# Patient Record
Sex: FEMALE | Race: WHITE | NOT HISPANIC OR LATINO | Employment: OTHER | URBAN - METROPOLITAN AREA
[De-identification: names, ages, dates, MRNs, and addresses within clinical notes are randomized per-mention and may not be internally consistent; named-entity substitution may affect disease eponyms.]

---

## 2017-01-24 NOTE — TELEPHONE ENCOUNTER
Pt's pharmacy requesting a refill for noted medication. Pt LOV 1/20/16 with an upcoming appt 1/31/17. Please review and advise. Thank you. CLC

## 2017-01-25 RX ORDER — CARVEDILOL 25 MG/1
25 TABLET ORAL DAILY
Qty: 90 TABLET | Refills: 3 | Status: SHIPPED | OUTPATIENT
Start: 2017-01-25 | End: 2017-11-17 | Stop reason: SDUPTHER

## 2017-01-31 ENCOUNTER — OFFICE VISIT (OUTPATIENT)
Dept: FAMILY MEDICINE | Facility: CLINIC | Age: 82
End: 2017-01-31
Payer: MEDICARE

## 2017-01-31 VITALS
HEIGHT: 62 IN | SYSTOLIC BLOOD PRESSURE: 132 MMHG | OXYGEN SATURATION: 97 % | BODY MASS INDEX: 19.92 KG/M2 | RESPIRATION RATE: 16 BRPM | WEIGHT: 108.25 LBS | DIASTOLIC BLOOD PRESSURE: 60 MMHG | HEART RATE: 72 BPM

## 2017-01-31 DIAGNOSIS — N25.81 SECONDARY HYPERPARATHYROIDISM (OF RENAL ORIGIN): ICD-10-CM

## 2017-01-31 DIAGNOSIS — E46 OTHER PROTEIN-CALORIE MALNUTRITION: ICD-10-CM

## 2017-01-31 DIAGNOSIS — E03.4 HYPOTHYROIDISM DUE TO ACQUIRED ATROPHY OF THYROID: ICD-10-CM

## 2017-01-31 DIAGNOSIS — N18.4 CHRONIC KIDNEY DISEASE, STAGE IV (SEVERE): Primary | ICD-10-CM

## 2017-01-31 PROCEDURE — 99214 OFFICE O/P EST MOD 30 MIN: CPT | Mod: S$PBB,,, | Performed by: FAMILY MEDICINE

## 2017-01-31 PROCEDURE — 99999 PR PBB SHADOW E&M-EST. PATIENT-LVL III: CPT | Mod: PBBFAC,,, | Performed by: FAMILY MEDICINE

## 2017-01-31 PROCEDURE — 90732 PPSV23 VACC 2 YRS+ SUBQ/IM: CPT | Mod: PBBFAC,PO | Performed by: FAMILY MEDICINE

## 2017-01-31 PROCEDURE — 99213 OFFICE O/P EST LOW 20 MIN: CPT | Mod: PBBFAC,PO | Performed by: FAMILY MEDICINE

## 2017-01-31 NOTE — PROGRESS NOTES
HPI  Nikki Jaimes is a 85 y.o. female with multiple medical diagnoses as listed in the medical history and problem list that presents for interval evaluation  .      HPI  Here today for interval evaluation  No new complaints.  Back pain resolved  Seeing Nephrology regarding renal failure and CKD IV    PAST MEDICAL HISTORY:  Past Medical History   Diagnosis Date    Compression fracture of thoracic vertebra     Hypothyroid     Osteoporosis, unspecified        PAST SURGICAL HISTORY:  History reviewed. No pertinent past surgical history.    SOCIAL HISTORY:  Social History     Social History    Marital status:      Spouse name: N/A    Number of children: N/A    Years of education: N/A     Occupational History    Not on file.     Social History Main Topics    Smoking status: Never Smoker    Smokeless tobacco: Never Used    Alcohol use No    Drug use: No    Sexual activity: Not on file     Other Topics Concern    Not on file     Social History Narrative       FAMILY HISTORY:  Family History   Problem Relation Age of Onset    Heart disease Father     Diabetes Sister     Stroke Sister        ALLERGIES AND MEDICATIONS: updated and reviewed.  Review of patient's allergies indicates:   Allergen Reactions    Naproxen Other (See Comments)     hemorrhage     Current Outpatient Prescriptions   Medication Sig Dispense Refill    calcitRIOL (ROCALTROL) 0.25 MCG Cap Take 1 capsule (0.25 mcg total) by mouth every Mon, Tues, Wed, Thurs, Fri. 90 capsule 1    carvedilol (COREG) 25 MG tablet Take 1 tablet (25 mg total) by mouth once daily. 90 tablet 3    levothyroxine (SYNTHROID) 50 MCG tablet TAKE ONE TABLET BY MOUTH EVERY DAY 30 tablet 11    raloxifene (EVISTA) 60 mg tablet ONE DAILY 30 tablet 6     No current facility-administered medications for this visit.        ROS  Review of Systems   Constitutional: Negative for fatigue, fever and unexpected weight change.   HENT: Negative for congestion, hearing loss,  "rhinorrhea and sore throat.    Eyes: Negative for visual disturbance.   Respiratory: Negative for cough, chest tightness, shortness of breath and wheezing.    Cardiovascular: Negative for chest pain, palpitations and leg swelling.   Gastrointestinal: Negative for abdominal distention, abdominal pain, blood in stool, constipation, diarrhea, nausea and vomiting.   Genitourinary: Negative for difficulty urinating, dysuria, frequency, hematuria, menstrual problem, pelvic pain, urgency and vaginal bleeding.   Musculoskeletal: Negative for back pain, joint swelling and neck pain.   Skin: Negative for rash.   Neurological: Negative for dizziness, tremors, weakness, light-headedness, numbness and headaches.   Psychiatric/Behavioral: Negative for confusion, dysphoric mood and sleep disturbance. The patient is not nervous/anxious.        Physical Exam  Vitals:    01/31/17 1429   BP: 132/60   Pulse: 72   Resp: 16    Body mass index is 19.8 kg/(m^2).  Weight: 49.1 kg (108 lb 3.9 oz)   Height: 5' 2" (157.5 cm)     Physical Exam   Constitutional: She is oriented to person, place, and time. She appears well-developed and well-nourished.   HENT:   Head: Normocephalic and atraumatic.   Right Ear: External ear normal.   Left Ear: External ear normal.   Nose: Nose normal.   Mouth/Throat: Oropharynx is clear and moist.   Eyes: Conjunctivae and EOM are normal. Pupils are equal, round, and reactive to light. No scleral icterus.   Neck: Normal range of motion. Neck supple. No JVD present. No thyromegaly present.   Cardiovascular: Normal rate, regular rhythm and normal heart sounds.  Exam reveals no gallop and no friction rub.    No murmur heard.  Pulmonary/Chest: Effort normal and breath sounds normal. She has no wheezes. She has no rales.   Abdominal: Soft. Bowel sounds are normal. She exhibits no distension and no mass. There is no tenderness. There is no rebound and no guarding.   Musculoskeletal: Normal range of motion. She exhibits " no edema.   Lymphadenopathy:     She has no cervical adenopathy.   Neurological: She is alert and oriented to person, place, and time. She has normal strength. No cranial nerve deficit or sensory deficit.   Skin: Skin is warm and dry. No rash noted.   Vitals reviewed.    Lab Results   Component Value Date    WBC 8.82 10/22/2013    HGB 11.4 (L) 10/22/2013    HCT 35.4 (L) 10/22/2013     (H) 10/22/2013    CHOL 191 10/09/2012    TRIG 116 10/09/2012    HDL 40 10/09/2012    ALT 5 (L) 10/22/2012    AST 8 (L) 10/22/2012     11/14/2016    K 4.3 11/14/2016     (H) 11/14/2016    CREATININE 2.7 (H) 11/14/2016    BUN 26 (H) 11/14/2016    CO2 20 (L) 11/14/2016    TSH 4.400 (H) 01/25/2016         Health Maintenance       Date Due Completion Date    TETANUS VACCINE 12/4/1949 ---    Zoster Vaccine 12/4/1991 ---    Influenza Vaccine 8/1/2016 ---    Pneumococcal (65+) (2 of 2 - PPSV23) 1/20/2017 1/20/2016    DEXA SCAN 5/19/2017 5/19/2014 (Declined)    Override on 5/19/2014: Declined    Lipid Panel 10/9/2017 10/9/2012          Assessment & Plan    Chronic kidney disease, stage IV (severe) with Secondary hyperparathyroidism (of renal origin)  - Continue current therapy  - Serial blood pressure monitoring  - Diet and exercise education.  - Continue Nephrology    Hypothyroidism due to acquired atrophy of thyroid with Other protein-calorie malnutrition  - Continue current therapy   - Check TSH and lipids    Other orders  -     Pneumococcal Polysaccharide Vaccine (23 Valent) (SQ/IM)        Return in about 6 months (around 7/31/2017).

## 2017-02-01 ENCOUNTER — LAB VISIT (OUTPATIENT)
Dept: LAB | Facility: HOSPITAL | Age: 82
End: 2017-02-01
Attending: FAMILY MEDICINE
Payer: MEDICARE

## 2017-02-01 DIAGNOSIS — E46 OTHER PROTEIN-CALORIE MALNUTRITION: ICD-10-CM

## 2017-02-01 DIAGNOSIS — E03.4 HYPOTHYROIDISM DUE TO ACQUIRED ATROPHY OF THYROID: ICD-10-CM

## 2017-02-01 LAB
CHOLEST/HDLC SERPL: 5.1 {RATIO}
HDL/CHOLESTEROL RATIO: 19.7 %
HDLC SERPL-MCNC: 249 MG/DL
HDLC SERPL-MCNC: 49 MG/DL
LDLC SERPL CALC-MCNC: 175.8 MG/DL
NONHDLC SERPL-MCNC: 200 MG/DL
T4 FREE SERPL-MCNC: 1.35 NG/DL
TRIGL SERPL-MCNC: 121 MG/DL
TSH SERPL DL<=0.005 MIU/L-ACNC: 4.54 UIU/ML

## 2017-02-01 PROCEDURE — 80061 LIPID PANEL: CPT

## 2017-02-01 PROCEDURE — 36415 COLL VENOUS BLD VENIPUNCTURE: CPT | Mod: PO

## 2017-02-01 PROCEDURE — 84443 ASSAY THYROID STIM HORMONE: CPT

## 2017-02-01 PROCEDURE — 84439 ASSAY OF FREE THYROXINE: CPT

## 2017-02-07 RX ORDER — LEVOTHYROXINE SODIUM 50 UG/1
50 TABLET ORAL DAILY
Qty: 30 TABLET | Refills: 11 | Status: SHIPPED | OUTPATIENT
Start: 2017-02-07 | End: 2018-02-02 | Stop reason: SDUPTHER

## 2017-02-23 ENCOUNTER — TELEPHONE (OUTPATIENT)
Dept: NEPHROLOGY | Facility: CLINIC | Age: 82
End: 2017-02-23

## 2017-02-23 NOTE — TELEPHONE ENCOUNTER
----- Message from Brii Albarado sent at 2/23/2017 11:02 AM CST -----  Contact: , Prabhakar Jaimes  Patient called asking for advice about taking Zicam. , Prabhakar Jaimes needs to know if it is Ok for her to take.  Please call patient at [phone]. Thanks!

## 2017-02-24 NOTE — TELEPHONE ENCOUNTER
Patient informed.  She said she is feeling better.  When asked what is considered temporary, I advised her not to use it for more than a week.  Patient voiced understanding.

## 2017-03-23 RX ORDER — CALCITRIOL 0.25 UG/1
CAPSULE ORAL
Qty: 90 CAPSULE | Refills: 3 | Status: SHIPPED | OUTPATIENT
Start: 2017-03-23 | End: 2017-11-17 | Stop reason: SDUPTHER

## 2017-03-24 RX ORDER — RALOXIFENE HYDROCHLORIDE 60 MG/1
TABLET, FILM COATED ORAL
Qty: 90 TABLET | Refills: 3 | Status: SHIPPED | OUTPATIENT
Start: 2017-03-24 | End: 2018-06-11 | Stop reason: SDUPTHER

## 2017-05-12 ENCOUNTER — LAB VISIT (OUTPATIENT)
Dept: LAB | Facility: HOSPITAL | Age: 82
End: 2017-05-12
Attending: INTERNAL MEDICINE
Payer: MEDICARE

## 2017-05-12 DIAGNOSIS — N18.4 CHRONIC KIDNEY DISEASE, STAGE IV (SEVERE): ICD-10-CM

## 2017-05-12 LAB
ALBUMIN SERPL BCP-MCNC: 2.8 G/DL
ANION GAP SERPL CALC-SCNC: 9 MMOL/L
BASOPHILS # BLD AUTO: 0.05 K/UL
BASOPHILS NFR BLD: 0.8 %
BUN SERPL-MCNC: 30 MG/DL
CALCIUM SERPL-MCNC: 9.6 MG/DL
CHLORIDE SERPL-SCNC: 105 MMOL/L
CO2 SERPL-SCNC: 23 MMOL/L
CREAT SERPL-MCNC: 3.2 MG/DL
DIFFERENTIAL METHOD: ABNORMAL
EOSINOPHIL # BLD AUTO: 0.4 K/UL
EOSINOPHIL NFR BLD: 6.8 %
ERYTHROCYTE [DISTWIDTH] IN BLOOD BY AUTOMATED COUNT: 14.1 %
EST. GFR  (AFRICAN AMERICAN): 14.5 ML/MIN/1.73 M^2
EST. GFR  (NON AFRICAN AMERICAN): 12.6 ML/MIN/1.73 M^2
GLUCOSE SERPL-MCNC: 92 MG/DL
HCT VFR BLD AUTO: 32.7 %
HGB BLD-MCNC: 10.3 G/DL
LYMPHOCYTES # BLD AUTO: 1.6 K/UL
LYMPHOCYTES NFR BLD: 24.5 %
MCH RBC QN AUTO: 29.6 PG
MCHC RBC AUTO-ENTMCNC: 31.5 %
MCV RBC AUTO: 94 FL
MONOCYTES # BLD AUTO: 0.5 K/UL
MONOCYTES NFR BLD: 7.8 %
NEUTROPHILS # BLD AUTO: 3.9 K/UL
NEUTROPHILS NFR BLD: 59.5 %
PHOSPHATE SERPL-MCNC: 4.6 MG/DL
PLATELET # BLD AUTO: 325 K/UL
PMV BLD AUTO: 10.2 FL
POTASSIUM SERPL-SCNC: 4.5 MMOL/L
PTH-INTACT SERPL-MCNC: 45 PG/ML
RBC # BLD AUTO: 3.48 M/UL
SODIUM SERPL-SCNC: 137 MMOL/L
WBC # BLD AUTO: 6.5 K/UL

## 2017-05-12 PROCEDURE — 36415 COLL VENOUS BLD VENIPUNCTURE: CPT | Mod: PO

## 2017-05-12 PROCEDURE — 80069 RENAL FUNCTION PANEL: CPT

## 2017-05-12 PROCEDURE — 83970 ASSAY OF PARATHORMONE: CPT

## 2017-05-12 PROCEDURE — 85025 COMPLETE CBC W/AUTO DIFF WBC: CPT

## 2017-05-19 ENCOUNTER — OFFICE VISIT (OUTPATIENT)
Dept: NEPHROLOGY | Facility: CLINIC | Age: 82
End: 2017-05-19
Payer: MEDICARE

## 2017-05-19 VITALS
HEIGHT: 64 IN | SYSTOLIC BLOOD PRESSURE: 115 MMHG | HEART RATE: 72 BPM | DIASTOLIC BLOOD PRESSURE: 72 MMHG | RESPIRATION RATE: 16 BRPM

## 2017-05-19 DIAGNOSIS — N25.81 HYPERPARATHYROIDISM DUE TO RENAL INSUFFICIENCY: ICD-10-CM

## 2017-05-19 DIAGNOSIS — N20.0 CALCULUS OF KIDNEY: ICD-10-CM

## 2017-05-19 DIAGNOSIS — N28.1 ACQUIRED CYST OF KIDNEY: ICD-10-CM

## 2017-05-19 DIAGNOSIS — N18.4 CHRONIC KIDNEY DISEASE, STAGE IV (SEVERE): Primary | ICD-10-CM

## 2017-05-19 PROCEDURE — 99999 PR PBB SHADOW E&M-EST. PATIENT-LVL III: CPT | Mod: PBBFAC,,, | Performed by: INTERNAL MEDICINE

## 2017-05-19 PROCEDURE — 99214 OFFICE O/P EST MOD 30 MIN: CPT | Mod: S$PBB,,, | Performed by: INTERNAL MEDICINE

## 2017-05-19 PROCEDURE — 99213 OFFICE O/P EST LOW 20 MIN: CPT | Mod: PBBFAC,PO | Performed by: INTERNAL MEDICINE

## 2017-05-19 NOTE — MR AVS SNAPSHOT
[facility]  [address] [facility] [address]  [facility] [address]  Phone: [phone]                  Nikki Jaimes   2017 2:00 PM   Office Visit    Description:  Female : 1931   Provider:  Buddy Chávez MD   Department:  [facility]           Reason for Visit     Follow-up           Diagnoses this Visit        Comments    Chronic kidney disease, stage IV (severe)    -  Primary     Calculus of kidney         Acquired cyst of kidney         Hyperparathyroidism due to renal insufficiency                To Do List           Future Appointments        Provider Department Dept Phone    2017 12:00 PM LAB, [place] [facility] [phone]    2017 12:30 PM URINE [facility] [phone]    2017 2:30 PM Mj Castro MD [facility] [phone]    2017 10:00 AM LAB, [place] [facility] [phone]    2017 10:15 AM URINE [facility] [phone]      Goals (5 Years of Data)     None      Follow-Up and Disposition     Return in about 6 months (around 2017).      [facility]     [facility] Nurse Care Line - / Assistance  Unless otherwise directed by your provider, please contact [facility], our nurse care line that is available for  assistance.     Registered nurses in the [facility] provide: appointment scheduling, clinical advisement, health education, and other advisory services.  Call: [phone] (toll free)               Medications           Message regarding Medications     Verify the changes and/or additions to your medication regime listed below are the same as discussed with your clinician today.  If any of these changes or additions are incorrect, please notify your healthcare provider.             Verify that the below list of medications is an accurate representation of the medications you are currently taking.   "If none reported, the list may be blank. If incorrect, please contact your healthcare provider. Carry this list with you in case of emergency.           Current Medications     calcitRIOL (ROCALTROL) 0.25 MCG Cap TAKE 1 CAPSULES BY MOUTH EVERY MONDAY, TUESDAY, WEDNESDAY, THURSDAY, AND FRIDAY.    carvedilol (COREG) 25 MG tablet Take 1 tablet (25 mg total) by mouth once daily.    levothyroxine (SYNTHROID) 50 MCG tablet Take 1 tablet (50 mcg total) by mouth once daily.    raloxifene (EVISTA) 60 mg tablet ONE DAILY           Clinical Reference Information           Your Vitals Were     BP Pulse Resp Height          115/72 72 16 5' 4" (1.626 m)        Blood Pressure          Most Recent Value    BP  115/72      Allergies as of 5/19/2017     Naproxen      Immunizations Administered on Date of Encounter - 5/19/2017     None      Orders Placed During Today's Visit     Future Labs/Procedures Expected by Expires    Renal function panel  6/6/2017 6/30/2017    Urinalysis  6/6/2017 6/30/2017      [facility] Sign-Up     Activating your [facility] account is as easy as 1-2-3!     1) Visit [email or web address], select Sign Up Now, enter this activation code and your date of birth, then select Next.  [email or web address]  Expires: 7/3/2017  2:30 PM      2) Create a username and password to use when you visit [facility] in the future and select a security question in case you lose your password and select Next.    3) Enter your e-mail address and click Sign Up!    Additional Information  If you have questions, please e-mail [email or web address] or call [phone] to talk to our [facility] staff. Remember, [facility] is NOT to be used for urgent needs. For medical emergencies, dial 911.         Language Assistance Services     ATTENTION: Language assistance services are available, free of charge. Please call [phone].      ATENCIÓN: Si habla español, tiene a avery disposición servicios gratuitos de asistencia lingüística. Llame al " [phone].     ELIAS Ý: N?u b?n nói Ti?ng Vi?t, có các d?ch v? h? tr? ngôn ng? mi?n phí dành cho b?n. G?i s? [phone].         [facility] complies with applicable Federal civil rights laws and does not discriminate on the basis of race, color, national origin, age, disability, or sex.

## 2017-05-19 NOTE — PROGRESS NOTES
"Subjective:       Patient ID: Nikki Jaimes is a 85 y.o. White female who presents for return patient evaluation for chronic renal failure.    She is doing well with no new problems.  She has no uremic or urinary symptoms and is in her usual state of health.        Review of Systems   Constitutional: Positive for fatigue. Negative for appetite change, chills, fever and unexpected weight change.   Eyes: Negative for visual disturbance.   Respiratory: Negative for cough and shortness of breath.    Cardiovascular: Negative for chest pain and leg swelling.   Gastrointestinal: Negative for abdominal pain, diarrhea, nausea and vomiting.   Genitourinary: Negative for difficulty urinating, dysuria and hematuria.   Musculoskeletal: Positive for gait problem. Negative for back pain.   Skin: Negative for rash.   Neurological: Negative for weakness and headaches.         /72  Pulse 72  Resp 16  Ht 5' 4" (1.626 m)    Objective:      Physical Exam   Constitutional: She appears well-developed and well-nourished. No distress.   HENT:   Head: Normocephalic and atraumatic.   Eyes: Conjunctivae are normal. No scleral icterus.   Neck: Normal range of motion. No JVD present.   Cardiovascular: Normal rate, regular rhythm and normal heart sounds.  Exam reveals no gallop and no friction rub.    No murmur heard.  Pulmonary/Chest: Effort normal and breath sounds normal. No respiratory distress. She has no wheezes.   Abdominal: Soft. Bowel sounds are normal. She exhibits no distension. There is no tenderness.   Musculoskeletal: She exhibits no edema.   Skin: Skin is warm and dry. No rash noted.   Psychiatric: She has a normal mood and affect.   Vitals reviewed.      Assessment:       1. Chronic kidney disease, stage IV (severe)    2. Calculus of kidney    3. Acquired cyst of kidney    4. Hyperparathyroidism due to renal insufficiency        Plan:   Return to clinic in 6 months.  Labs for next visit include rp, pth, cbc.  Baseline " creatinine is 2.3 since 2012.  PTH is 45 with a calcium of 9.6.  RP and Ua on 6/6.  I do not see any reason why she has had a departure from her usual baseline.  She is asymptomatic.

## 2017-06-07 ENCOUNTER — TELEPHONE (OUTPATIENT)
Dept: NEPHROLOGY | Facility: CLINIC | Age: 82
End: 2017-06-07

## 2017-06-07 NOTE — TELEPHONE ENCOUNTER
----- Message from Ning Marmolejo sent at 6/7/2017  1:28 PM CDT -----  Contact:   Needs to know if it's ok for patient to take Allegra and if so, what dosage.  Please call back at [phone] (home)

## 2017-06-08 ENCOUNTER — LAB VISIT (OUTPATIENT)
Dept: LAB | Facility: HOSPITAL | Age: 82
End: 2017-06-08
Attending: INTERNAL MEDICINE
Payer: MEDICARE

## 2017-06-08 DIAGNOSIS — N20.0 CALCULUS OF KIDNEY: ICD-10-CM

## 2017-06-08 DIAGNOSIS — N25.81 HYPERPARATHYROIDISM DUE TO RENAL INSUFFICIENCY: ICD-10-CM

## 2017-06-08 DIAGNOSIS — N18.4 CHRONIC KIDNEY DISEASE, STAGE IV (SEVERE): ICD-10-CM

## 2017-06-08 DIAGNOSIS — N28.1 ACQUIRED CYST OF KIDNEY: ICD-10-CM

## 2017-06-08 LAB
ALBUMIN SERPL BCP-MCNC: 2.7 G/DL
ANION GAP SERPL CALC-SCNC: 9 MMOL/L
BUN SERPL-MCNC: 31 MG/DL
CALCIUM SERPL-MCNC: 9.7 MG/DL
CHLORIDE SERPL-SCNC: 104 MMOL/L
CO2 SERPL-SCNC: 23 MMOL/L
CREAT SERPL-MCNC: 3.2 MG/DL
EST. GFR  (AFRICAN AMERICAN): 14.5 ML/MIN/1.73 M^2
EST. GFR  (NON AFRICAN AMERICAN): 12.6 ML/MIN/1.73 M^2
GLUCOSE SERPL-MCNC: 107 MG/DL
PHOSPHATE SERPL-MCNC: 4.5 MG/DL
POTASSIUM SERPL-SCNC: 5 MMOL/L
SODIUM SERPL-SCNC: 136 MMOL/L

## 2017-06-08 PROCEDURE — 36415 COLL VENOUS BLD VENIPUNCTURE: CPT | Mod: PO

## 2017-06-08 PROCEDURE — 80069 RENAL FUNCTION PANEL: CPT

## 2017-07-05 ENCOUNTER — LAB VISIT (OUTPATIENT)
Dept: LAB | Facility: HOSPITAL | Age: 82
End: 2017-07-05
Attending: FAMILY MEDICINE
Payer: MEDICARE

## 2017-07-05 ENCOUNTER — OFFICE VISIT (OUTPATIENT)
Dept: FAMILY MEDICINE | Facility: CLINIC | Age: 82
End: 2017-07-05
Payer: MEDICARE

## 2017-07-05 VITALS
DIASTOLIC BLOOD PRESSURE: 72 MMHG | BODY MASS INDEX: 18.44 KG/M2 | RESPIRATION RATE: 16 BRPM | HEIGHT: 64 IN | WEIGHT: 108 LBS | HEART RATE: 70 BPM | SYSTOLIC BLOOD PRESSURE: 120 MMHG

## 2017-07-05 DIAGNOSIS — E03.4 HYPOTHYROIDISM DUE TO ACQUIRED ATROPHY OF THYROID: ICD-10-CM

## 2017-07-05 DIAGNOSIS — Z78.0 ASYMPTOMATIC MENOPAUSAL STATE: ICD-10-CM

## 2017-07-05 DIAGNOSIS — N18.4 CHRONIC KIDNEY DISEASE, STAGE IV (SEVERE): Primary | ICD-10-CM

## 2017-07-05 LAB — TSH SERPL DL<=0.005 MIU/L-ACNC: 3.5 UIU/ML

## 2017-07-05 PROCEDURE — 84443 ASSAY THYROID STIM HORMONE: CPT

## 2017-07-05 PROCEDURE — 99213 OFFICE O/P EST LOW 20 MIN: CPT | Mod: S$PBB,,, | Performed by: FAMILY MEDICINE

## 2017-07-05 PROCEDURE — 1126F AMNT PAIN NOTED NONE PRSNT: CPT | Mod: ,,, | Performed by: FAMILY MEDICINE

## 2017-07-05 PROCEDURE — 36415 COLL VENOUS BLD VENIPUNCTURE: CPT | Mod: PO

## 2017-07-05 PROCEDURE — 1159F MED LIST DOCD IN RCRD: CPT | Mod: ,,, | Performed by: FAMILY MEDICINE

## 2017-07-05 PROCEDURE — 99999 PR PBB SHADOW E&M-EST. PATIENT-LVL III: CPT | Mod: PBBFAC,,, | Performed by: FAMILY MEDICINE

## 2017-07-05 NOTE — PROGRESS NOTES
Subjective:       Patient ID: Nikki Jaimes is a 85 y.o. female    Chief Complaint: Chronic Kidney Disease (follow up; hm due: tetanus, zoster, dexa)    HPI  Here today for interval evaluation  She has no new complaints    Review of Systems      Objective:   Physical Exam   Constitutional: She is oriented to person, place, and time. She appears well-developed and well-nourished.   Neurological: She is alert and oriented to person, place, and time.   Vitals reviewed.    Lab Results   Component Value Date    WBC 6.50 05/12/2017    HGB 10.3 (L) 05/12/2017    HCT 32.7 (L) 05/12/2017     05/12/2017    CHOL 249 (H) 02/01/2017    TRIG 121 02/01/2017    HDL 49 02/01/2017    ALT 5 (L) 10/22/2012    AST 8 (L) 10/22/2012     06/08/2017    K 5.0 06/08/2017     06/08/2017    CREATININE 3.2 (H) 06/08/2017    BUN 31 (H) 06/08/2017    CO2 23 06/08/2017    TSH 4.539 (H) 02/01/2017          Assessment:       1. Chronic kidney disease, stage IV (severe)     2. Hypothyroidism due to acquired atrophy of thyroid  TSH   3. Asymptomatic menopausal state  DXA Bone Density Spine And Hip         Plan:       Chronic kidney disease, stage IV (severe)  - Continue current therapy  - Continue Nephrology    Hypothyroidism due to acquired atrophy of thyroid  -     TSH; Future; Expected date: 07/05/2017  - Continue current therapy     Asymptomatic menopausal state  -     DXA Bone Density Spine And Hip; Future; Expected date: 07/05/2017

## 2017-07-07 ENCOUNTER — HOSPITAL ENCOUNTER (OUTPATIENT)
Dept: RADIOLOGY | Facility: HOSPITAL | Age: 82
Discharge: HOME | End: 2017-07-07
Attending: FAMILY MEDICINE
Payer: MEDICARE

## 2017-07-07 DIAGNOSIS — Z78.0 ASYMPTOMATIC MENOPAUSAL STATE: ICD-10-CM

## 2017-07-07 PROCEDURE — 77080 DXA BONE DENSITY AXIAL: CPT | Mod: TC,PO

## 2017-07-07 PROCEDURE — 77080 DXA BONE DENSITY AXIAL: CPT | Mod: 26,,, | Performed by: RADIOLOGY

## 2017-10-03 ENCOUNTER — TELEPHONE (OUTPATIENT)
Dept: FAMILY MEDICINE | Facility: CLINIC | Age: 82
End: 2017-10-03

## 2017-10-03 DIAGNOSIS — L60.0 INGROWN TOENAIL: Primary | ICD-10-CM

## 2017-10-03 NOTE — TELEPHONE ENCOUNTER
----- Message from Yves Graff sent at 10/2/2017  4:04 PM CDT -----  Contact: Prabhakar    Wants to have an ok from Dr. Castro to see podiatry. His wife has ingrown toe nail. It is starting to hurt her much more lately. Please [phone] (home)

## 2017-10-05 ENCOUNTER — OFFICE VISIT (OUTPATIENT)
Dept: PODIATRY | Facility: CLINIC | Age: 82
End: 2017-10-05
Payer: MEDICARE

## 2017-10-05 VITALS — HEIGHT: 64 IN | WEIGHT: 106.69 LBS | BODY MASS INDEX: 18.21 KG/M2

## 2017-10-05 DIAGNOSIS — L03.031 PARONYCHIA OF GREAT TOE, RIGHT: ICD-10-CM

## 2017-10-05 DIAGNOSIS — B35.1 ONYCHOMYCOSIS DUE TO DERMATOPHYTE: ICD-10-CM

## 2017-10-05 DIAGNOSIS — L60.0 INGROWN NAIL: Primary | ICD-10-CM

## 2017-10-05 PROCEDURE — 99212 OFFICE O/P EST SF 10 MIN: CPT | Mod: PBBFAC,PO | Performed by: PODIATRIST

## 2017-10-05 PROCEDURE — 99999 PR PBB SHADOW E&M-EST. PATIENT-LVL II: CPT | Mod: PBBFAC,,, | Performed by: PODIATRIST

## 2017-10-05 PROCEDURE — 10060 I&D ABSCESS SIMPLE/SINGLE: CPT | Mod: T5,PBBFAC,PO | Performed by: PODIATRIST

## 2017-10-05 PROCEDURE — 10060 I&D ABSCESS SIMPLE/SINGLE: CPT | Mod: S$PBB,,, | Performed by: PODIATRIST

## 2017-10-05 PROCEDURE — 99203 OFFICE O/P NEW LOW 30 MIN: CPT | Mod: 25,S$PBB,, | Performed by: PODIATRIST

## 2017-10-05 RX ORDER — LATANOPROST 50 UG/ML
1 SOLUTION/ DROPS OPHTHALMIC NIGHTLY
Refills: 3 | COMMUNITY
Start: 2017-09-11

## 2017-10-05 NOTE — LETTER
October 5, 2017      Mj Castro MD  [address] [facility] [address] [place] [address]           [facility]  [address] [facility] [address] [place] [address]  Phone: [phone]          Patient: Nikki Jaimes   MR Number: [id number]   YOB: 1931   Date of Visit: 10/5/2017       Dear Dr. Mj Castro:    Thank you for referring Nikki Jaimes to me for evaluation. Attached you will find relevant portions of my assessment and plan of care.    If you have questions, please do not hesitate to call me. I look forward to following Nikki Jaimes along with you.    Sincerely,    Be Hidalgo, MERCEDEZ    Enclosure  CC:  No Recipients    If you would like to receive this communication electronically, please contact [email or web address] or [phone] to request more information on [facility] access.    For providers and/or their staff who would like to refer a patient to [facility], please contact us through our one-stop-shop provider referral line, [facility], at [phone].    If you feel you have received this communication in error or would no longer like to receive these types of communications, please e-mail [email or web address]

## 2017-10-05 NOTE — PROGRESS NOTES
Subjective:      Patient ID: Nikki Jaimes is a 85 y.o. female.    Chief Complaint: Ingrown Toenail (Right great) and Other Misc (PCP:  Dr Castro  7/5/17)    Nikki is a 85 y.o. female who presents to the clinic complaining of painful ingrown toenail on the right foot great toe medial border. The area has been painful for a couple of weeks now. She has noticed the area has gotten more red over the last couple of days. Has not done anything to treat it to date. Pain is worse with pressure to the area and with shoe wear. Patient and her  feel her nails are thick and difficult to cut and would like to inquire as to getting them all trimmed.     Review of Systems   Constitution: Positive for weakness. Negative for chills and fever.   Cardiovascular: Negative for claudication and leg swelling.   Respiratory: Negative for shortness of breath.    Skin: Positive for color change and nail changes. Negative for itching and rash.   Musculoskeletal: Negative for muscle cramps, muscle weakness and myalgias.   Gastrointestinal: Negative for nausea and vomiting.   Neurological: Negative for focal weakness, loss of balance, numbness and paresthesias.           Objective:      Physical Exam   Constitutional: She is oriented to person, place, and time. She appears well-developed and well-nourished. No distress.   Cardiovascular:   Pulses:       Dorsalis pedis pulses are 1+ on the right side, and 1+ on the left side.        Posterior tibial pulses are 1+ on the right side, and 1+ on the left side.   < 3 sec capillary refill time to toes 1-5 bilateral. Toes and feet are warm to touch proximally with normal distal cooling b/l. There is no hair growth on the feet and toes b/l. There is minimal edema b/l. There are varicosities present b/l.      Musculoskeletal:   Equinus noted b/l ankles with < 10 deg DF noted. MMT 5/5 in DF/PF/Inv/Ev resistance with no reproduction of pain in any direction. Passive range of motion of ankle and  pedal joints is painless b/l.     Neurological: She is alert and oriented to person, place, and time. She has normal strength. She displays no atrophy and no tremor. No sensory deficit. She exhibits normal muscle tone.   Negative tinel sign bilateral.   Skin: Skin is warm, dry and intact. No abrasion, no bruising, no burn, no ecchymosis, no laceration, no lesion, no petechiae and no rash noted. She is not diaphoretic. No cyanosis or erythema. No pallor. Nails show no clubbing.   Skin temperature, texture and turgor within normal limits and age appropriate.     medial hallux nail margin of right foot with ingrown nail plate. Surrounding erythema and minimal edema is noted there is no granuloma formation noted. There is slight purulent drainage with no malodor.    Nails 1-5 bilateral are thick 3-4 mm, long 3-6 mm, and discolored with subungual debris     Psychiatric: She has a normal mood and affect. Her behavior is normal.           Assessment:       Encounter Diagnoses   Name Primary?    Ingrown nail Yes    Paronychia of great toe, right     Onychomycosis due to dermatophyte          Plan:       Nikki was seen today for ingrown toenail and other misc.    Diagnoses and all orders for this visit:    Ingrown nail    Paronychia of great toe, right    Onychomycosis due to dermatophyte      I counseled the patient on her conditions, their implications and medical management.    Utilizing sterile toenail clippers I aggressively debrided the offending nail border right hallux medial border approximately 3 mm from its edge and carried the nail plate incision down at an angle in order to wedge out the offending cryptotic portion of the nail plate. The offending border was then removed in total. A small amount of purulent drainage was noted and expelled from the area. The underlying wound base was granular. The area was flushed with sterile saline and covered with neosporin and a band-aid.  Minimal blood was drawn and  hemostasis achieved with pressure. No anesthesia was necessary. Patient tolerated the procedure well and related significant relief.    Discussed that if the ingrown nail returns or gives her repeated problems she can have a more invasive procedure done to remove the nail with or without matrixectomy. Risks and benefits of procedure explained.     With patient's verbal consent, nails were aggressively reduced and debrided x 10 to their soft tissue attachment mechanically and with electric , removing all offending nail and debris. Patient relates relief following the procedure. No anesthesia or hemostasis required. No blood loss. Patient does not qualify for covered service, it was explained that the service today would not be billed, if she needs help with nail debridement in the future she will need to make an appointment as a proc B, a handout was given explaining the procedure B program, patient and her  voiced understanding.    Return PHONG Hidalgo DPM

## 2017-11-13 ENCOUNTER — LAB VISIT (OUTPATIENT)
Dept: LAB | Facility: HOSPITAL | Age: 82
End: 2017-11-13
Attending: INTERNAL MEDICINE
Payer: MEDICARE

## 2017-11-13 DIAGNOSIS — N18.4 CHRONIC KIDNEY DISEASE, STAGE IV (SEVERE): ICD-10-CM

## 2017-11-13 LAB
ALBUMIN SERPL BCP-MCNC: 2.7 G/DL
ANION GAP SERPL CALC-SCNC: 8 MMOL/L
BUN SERPL-MCNC: 29 MG/DL
CALCIUM SERPL-MCNC: 8.7 MG/DL
CHLORIDE SERPL-SCNC: 108 MMOL/L
CO2 SERPL-SCNC: 22 MMOL/L
CREAT SERPL-MCNC: 2.9 MG/DL
EST. GFR  (AFRICAN AMERICAN): 16.4 ML/MIN/1.73 M^2
EST. GFR  (NON AFRICAN AMERICAN): 14.2 ML/MIN/1.73 M^2
GLUCOSE SERPL-MCNC: 99 MG/DL
PHOSPHATE SERPL-MCNC: 4.3 MG/DL
POTASSIUM SERPL-SCNC: 5.2 MMOL/L
PTH-INTACT SERPL-MCNC: 60 PG/ML
SODIUM SERPL-SCNC: 138 MMOL/L

## 2017-11-13 PROCEDURE — 83970 ASSAY OF PARATHORMONE: CPT

## 2017-11-13 PROCEDURE — 80069 RENAL FUNCTION PANEL: CPT

## 2017-11-13 PROCEDURE — 36415 COLL VENOUS BLD VENIPUNCTURE: CPT | Mod: PO

## 2017-11-17 ENCOUNTER — OFFICE VISIT (OUTPATIENT)
Dept: NEPHROLOGY | Facility: CLINIC | Age: 82
End: 2017-11-17
Payer: MEDICARE

## 2017-11-17 VITALS
OXYGEN SATURATION: 96 % | DIASTOLIC BLOOD PRESSURE: 68 MMHG | HEIGHT: 63 IN | SYSTOLIC BLOOD PRESSURE: 110 MMHG | HEART RATE: 86 BPM | BODY MASS INDEX: 19.03 KG/M2 | RESPIRATION RATE: 18 BRPM | WEIGHT: 107.38 LBS | TEMPERATURE: 98 F

## 2017-11-17 DIAGNOSIS — N20.0 CALCULUS OF KIDNEY: ICD-10-CM

## 2017-11-17 DIAGNOSIS — N25.81 HYPERPARATHYROIDISM DUE TO RENAL INSUFFICIENCY: ICD-10-CM

## 2017-11-17 DIAGNOSIS — N18.4 CHRONIC KIDNEY DISEASE, STAGE IV (SEVERE): Primary | ICD-10-CM

## 2017-11-17 DIAGNOSIS — N28.1 ACQUIRED CYST OF KIDNEY: ICD-10-CM

## 2017-11-17 PROCEDURE — 99999 PR PBB SHADOW E&M-EST. PATIENT-LVL III: CPT | Mod: PBBFAC,,, | Performed by: INTERNAL MEDICINE

## 2017-11-17 PROCEDURE — 99214 OFFICE O/P EST MOD 30 MIN: CPT | Mod: S$PBB,,, | Performed by: INTERNAL MEDICINE

## 2017-11-17 PROCEDURE — 99213 OFFICE O/P EST LOW 20 MIN: CPT | Mod: PBBFAC,PO | Performed by: INTERNAL MEDICINE

## 2017-11-17 RX ORDER — CARVEDILOL 12.5 MG/1
12.5 TABLET ORAL DAILY
Qty: 90 TABLET | Refills: 1 | Status: SHIPPED | OUTPATIENT
Start: 2017-11-17 | End: 2018-01-23 | Stop reason: SDUPTHER

## 2017-11-17 RX ORDER — CALCITRIOL 0.25 UG/1
0.25 CAPSULE ORAL DAILY
Qty: 90 CAPSULE | Refills: 1 | Status: SHIPPED | OUTPATIENT
Start: 2017-11-17 | End: 2019-04-29

## 2018-01-28 RX ORDER — CARVEDILOL 12.5 MG/1
12.5 TABLET ORAL DAILY
Qty: 90 TABLET | Refills: 1 | OUTPATIENT
Start: 2018-01-28

## 2018-01-28 RX ORDER — CARVEDILOL 12.5 MG/1
12.5 TABLET ORAL DAILY
Qty: 90 TABLET | Refills: 1 | Status: SHIPPED | OUTPATIENT
Start: 2018-01-28 | End: 2018-08-09 | Stop reason: SDUPTHER

## 2018-01-28 RX ORDER — CARVEDILOL 25 MG/1
TABLET ORAL
Qty: 90 TABLET | Refills: 1 | OUTPATIENT
Start: 2018-01-28

## 2018-02-05 RX ORDER — LEVOTHYROXINE SODIUM 50 UG/1
TABLET ORAL
Qty: 30 TABLET | Refills: 6 | Status: SHIPPED | OUTPATIENT
Start: 2018-02-05 | End: 2018-08-30 | Stop reason: SDUPTHER

## 2018-03-26 ENCOUNTER — TELEPHONE (OUTPATIENT)
Dept: FAMILY MEDICINE | Facility: CLINIC | Age: 83
End: 2018-03-26

## 2018-03-26 NOTE — TELEPHONE ENCOUNTER
Spoke to pt to reschedule appt on 3/27. Attempted to schedule pt on 4/23. Pt states that this is the second time he has had to reschedule and he cannot wait another month. Please advise.

## 2018-04-09 ENCOUNTER — OFFICE VISIT (OUTPATIENT)
Dept: FAMILY MEDICINE | Facility: CLINIC | Age: 83
End: 2018-04-09
Payer: MEDICARE

## 2018-04-09 VITALS
WEIGHT: 107.38 LBS | OXYGEN SATURATION: 98 % | HEIGHT: 63 IN | DIASTOLIC BLOOD PRESSURE: 68 MMHG | BODY MASS INDEX: 19.03 KG/M2 | SYSTOLIC BLOOD PRESSURE: 120 MMHG | HEART RATE: 72 BPM

## 2018-04-09 DIAGNOSIS — E46 PROTEIN-CALORIE MALNUTRITION, UNSPECIFIED SEVERITY: ICD-10-CM

## 2018-04-09 DIAGNOSIS — N18.4 CHRONIC KIDNEY DISEASE, STAGE IV (SEVERE): Primary | ICD-10-CM

## 2018-04-09 PROCEDURE — 99213 OFFICE O/P EST LOW 20 MIN: CPT | Mod: S$PBB,,, | Performed by: FAMILY MEDICINE

## 2018-04-09 PROCEDURE — 99213 OFFICE O/P EST LOW 20 MIN: CPT | Mod: PBBFAC,PO | Performed by: FAMILY MEDICINE

## 2018-04-09 PROCEDURE — 99999 PR PBB SHADOW E&M-EST. PATIENT-LVL III: CPT | Mod: PBBFAC,,, | Performed by: FAMILY MEDICINE

## 2018-04-09 NOTE — PROGRESS NOTES
Subjective:       Patient ID: Nikki Jaimes is a 86 y.o. female    Chief Complaint: Follow-up (Pt. here for a foolow up visit. Pt reports no new problems and no complaints today.)    HPI  Here today for interval evaluation  No new complaints.  Reports minimal appetite    Review of Systems     Objective:   Physical Exam   Constitutional: She appears well-developed and well-nourished.   HENT:   Head: Normocephalic and atraumatic.   Eyes: Conjunctivae and EOM are normal. Pupils are equal, round, and reactive to light. No scleral icterus.   Neck: Normal range of motion. Neck supple. No thyromegaly present.   Cardiovascular: Normal rate, regular rhythm and normal heart sounds.  Exam reveals no gallop and no friction rub.    No murmur heard.  Pulmonary/Chest: Effort normal and breath sounds normal. No respiratory distress. She has no wheezes. She has no rales.   Lymphadenopathy:     She has no cervical adenopathy.   Vitals reviewed.    CMP  Sodium   Date Value Ref Range Status   11/13/2017 138 136 - 145 mmol/L Final     Potassium   Date Value Ref Range Status   11/13/2017 5.2 (H) 3.5 - 5.1 mmol/L Final     Chloride   Date Value Ref Range Status   11/13/2017 108 95 - 110 mmol/L Final     CO2   Date Value Ref Range Status   11/13/2017 22 (L) 23 - 29 mmol/L Final     Glucose   Date Value Ref Range Status   11/13/2017 99 70 - 110 mg/dL Final     BUN, Bld   Date Value Ref Range Status   11/13/2017 29 (H) 8 - 23 mg/dL Final     Creatinine   Date Value Ref Range Status   11/13/2017 2.9 (H) 0.5 - 1.4 mg/dL Final     Calcium   Date Value Ref Range Status   11/13/2017 8.7 8.7 - 10.5 mg/dL Final     Total Protein   Date Value Ref Range Status   10/22/2012 8.1 6.0 - 8.4 g/dL Final     Albumin   Date Value Ref Range Status   11/13/2017 2.7 (L) 3.5 - 5.2 g/dL Final     Total Bilirubin   Date Value Ref Range Status   10/22/2012 0.3 0.1 - 1.0 mg/dl Final     Comment:     For infants and newborns, interpretation of results should be  based  on gestational age, weight and in agreement with clinical  observations.  .  Premature Infant recommended reference ranges:  Up to 24 hours.............<8.0 mg/dl  Up to 48 hours............<12.0 mg/dl  3-5 days..................<15.0 mg/dl  6-29 days.................<15.0 mg/dl     Alkaline Phosphatase   Date Value Ref Range Status   10/22/2012 68 55 - 135 U/L Final     AST   Date Value Ref Range Status   10/22/2012 8 (L) 10 - 40 U/L Final     ALT   Date Value Ref Range Status   10/22/2012 5 (L) 10 - 44 U/L Final     Anion Gap   Date Value Ref Range Status   11/13/2017 8 8 - 16 mmol/L Final     eGFR if    Date Value Ref Range Status   11/13/2017 16.4 (A) >60 mL/min/1.73 m^2 Final     eGFR if non    Date Value Ref Range Status   11/13/2017 14.2 (A) >60 mL/min/1.73 m^2 Final     Comment:     Calculation used to obtain the estimated glomerular filtration  rate (eGFR) is the CKD-EPI equation.             Assessment:       1. Chronic kidney disease, stage IV (severe)     2. Protein-calorie malnutrition, unspecified severity          Plan:       Chronic kidney disease, stage IV (severe)  - Continue current therapy  - Continue Nephrology    Protein-calorie malnutrition, unspecified severity  - Discussed calorie supplement    Follow-up in about 6 months (around 10/9/2018).

## 2018-05-10 ENCOUNTER — LAB VISIT (OUTPATIENT)
Dept: LAB | Facility: HOSPITAL | Age: 83
End: 2018-05-10
Attending: INTERNAL MEDICINE
Payer: MEDICARE

## 2018-05-10 DIAGNOSIS — N18.4 CHRONIC KIDNEY DISEASE, STAGE IV (SEVERE): ICD-10-CM

## 2018-05-10 LAB
ALBUMIN SERPL BCP-MCNC: 2.8 G/DL
ANION GAP SERPL CALC-SCNC: 5 MMOL/L
BASOPHILS # BLD AUTO: 0.09 K/UL
BASOPHILS NFR BLD: 1.2 %
BUN SERPL-MCNC: 29 MG/DL
CALCIUM SERPL-MCNC: 8.9 MG/DL
CHLORIDE SERPL-SCNC: 108 MMOL/L
CO2 SERPL-SCNC: 24 MMOL/L
CREAT SERPL-MCNC: 2.6 MG/DL
DIFFERENTIAL METHOD: ABNORMAL
EOSINOPHIL # BLD AUTO: 0.5 K/UL
EOSINOPHIL NFR BLD: 6 %
ERYTHROCYTE [DISTWIDTH] IN BLOOD BY AUTOMATED COUNT: 13.8 %
EST. GFR  (AFRICAN AMERICAN): 18.6 ML/MIN/1.73 M^2
EST. GFR  (NON AFRICAN AMERICAN): 16.1 ML/MIN/1.73 M^2
GLUCOSE SERPL-MCNC: 105 MG/DL
HCT VFR BLD AUTO: 33.7 %
HGB BLD-MCNC: 10.3 G/DL
IMM GRANULOCYTES # BLD AUTO: 0.02 K/UL
IMM GRANULOCYTES NFR BLD AUTO: 0.3 %
LYMPHOCYTES # BLD AUTO: 1.5 K/UL
LYMPHOCYTES NFR BLD: 19.9 %
MCH RBC QN AUTO: 29.9 PG
MCHC RBC AUTO-ENTMCNC: 30.6 G/DL
MCV RBC AUTO: 98 FL
MONOCYTES # BLD AUTO: 0.6 K/UL
MONOCYTES NFR BLD: 7.6 %
NEUTROPHILS # BLD AUTO: 4.9 K/UL
NEUTROPHILS NFR BLD: 65 %
NRBC BLD-RTO: 0 /100 WBC
PHOSPHATE SERPL-MCNC: 4.1 MG/DL
PLATELET # BLD AUTO: 319 K/UL
PMV BLD AUTO: 10.6 FL
POTASSIUM SERPL-SCNC: 4.7 MMOL/L
PTH-INTACT SERPL-MCNC: 94 PG/ML
RBC # BLD AUTO: 3.45 M/UL
SODIUM SERPL-SCNC: 137 MMOL/L
WBC # BLD AUTO: 7.54 K/UL

## 2018-05-10 PROCEDURE — 36415 COLL VENOUS BLD VENIPUNCTURE: CPT | Mod: PO

## 2018-05-10 PROCEDURE — 85025 COMPLETE CBC W/AUTO DIFF WBC: CPT

## 2018-05-10 PROCEDURE — 83970 ASSAY OF PARATHORMONE: CPT

## 2018-05-10 PROCEDURE — 80069 RENAL FUNCTION PANEL: CPT

## 2018-05-18 ENCOUNTER — OFFICE VISIT (OUTPATIENT)
Dept: NEPHROLOGY | Facility: CLINIC | Age: 83
End: 2018-05-18
Payer: MEDICARE

## 2018-05-18 VITALS
SYSTOLIC BLOOD PRESSURE: 122 MMHG | WEIGHT: 108.44 LBS | HEART RATE: 74 BPM | OXYGEN SATURATION: 95 % | DIASTOLIC BLOOD PRESSURE: 74 MMHG | HEIGHT: 63 IN | BODY MASS INDEX: 19.21 KG/M2

## 2018-05-18 DIAGNOSIS — N28.1 ACQUIRED CYST OF KIDNEY: ICD-10-CM

## 2018-05-18 DIAGNOSIS — N25.81 HYPERPARATHYROIDISM DUE TO RENAL INSUFFICIENCY: ICD-10-CM

## 2018-05-18 DIAGNOSIS — N20.0 CALCULUS OF KIDNEY: ICD-10-CM

## 2018-05-18 DIAGNOSIS — N18.4 CHRONIC KIDNEY DISEASE, STAGE IV (SEVERE): Primary | ICD-10-CM

## 2018-05-18 PROCEDURE — 99214 OFFICE O/P EST MOD 30 MIN: CPT | Mod: S$PBB,,, | Performed by: INTERNAL MEDICINE

## 2018-05-18 PROCEDURE — 99999 PR PBB SHADOW E&M-EST. PATIENT-LVL III: CPT | Mod: PBBFAC,,, | Performed by: INTERNAL MEDICINE

## 2018-05-18 PROCEDURE — 99213 OFFICE O/P EST LOW 20 MIN: CPT | Mod: PBBFAC,PO | Performed by: INTERNAL MEDICINE

## 2018-05-18 NOTE — PROGRESS NOTES
"Subjective:       Patient ID: Nikki Jaimes is a 86 y.o. White female who presents for return patient evaluation for chronic renal failure.    She has no uremic or urinary symptoms and is in her usual state of health.  There have been no recent illnesses, hospitalizations or procedures.        Review of Systems   Constitutional: Positive for fatigue. Negative for appetite change, chills, fever and unexpected weight change.   Eyes: Negative for visual disturbance.   Respiratory: Negative for cough and shortness of breath.    Cardiovascular: Negative for chest pain and leg swelling.   Gastrointestinal: Negative for abdominal pain, diarrhea, nausea and vomiting.   Genitourinary: Negative for difficulty urinating, dysuria and hematuria.   Musculoskeletal: Positive for gait problem. Negative for back pain and myalgias.   Skin: Negative for rash.   Neurological: Negative for weakness and headaches.   Psychiatric/Behavioral: Negative for sleep disturbance.       The past medical, family and social histories were reviewed for this encounter.     /74   Pulse 74   Ht 5' 3" (1.6 m)   Wt 49.2 kg (108 lb 7.5 oz)   SpO2 95%   BMI 19.21 kg/m²     Objective:      Physical Exam   Constitutional: She appears well-developed and well-nourished. No distress.   HENT:   Head: Normocephalic and atraumatic.   Eyes: Conjunctivae are normal. No scleral icterus.   Neck: Normal range of motion. No JVD present.   Cardiovascular: Normal rate, regular rhythm and normal heart sounds.  Exam reveals no gallop and no friction rub.    No murmur heard.  Pulmonary/Chest: Effort normal and breath sounds normal. No respiratory distress. She has no wheezes.   Abdominal: Soft. Bowel sounds are normal. She exhibits no distension. There is no tenderness.   Musculoskeletal: She exhibits no edema.   Skin: Skin is warm and dry. No rash noted.   Psychiatric: She has a normal mood and affect.   Vitals reviewed.      Assessment:       1. Chronic kidney " disease, stage IV (severe)    2. Hyperparathyroidism due to renal insufficiency    3. Calculus of kidney    4. Acquired cyst of kidney        Plan:   Return to clinic in 6 months.  Labs for next visit include rp, pth, cbc.  Baseline creatinine is 2.3 since 2012.  PTH is 94 with a calcium of 8.9.  UPC is 0.38.  Blood pressure is controlled on the current regimen.  Continue current medications as prescribed and reviewed.   Drop coreg to 12.5 mg daily given her BP currently.

## 2018-06-11 RX ORDER — RALOXIFENE HYDROCHLORIDE 60 MG/1
TABLET, FILM COATED ORAL
Qty: 90 TABLET | Refills: 3 | Status: SHIPPED | OUTPATIENT
Start: 2018-06-11 | End: 2020-01-03

## 2018-08-09 RX ORDER — CARVEDILOL 12.5 MG/1
TABLET ORAL
Qty: 90 TABLET | Refills: 2 | Status: SHIPPED | OUTPATIENT
Start: 2018-08-09 | End: 2019-05-05 | Stop reason: SDUPTHER

## 2018-08-14 ENCOUNTER — OFFICE VISIT (OUTPATIENT)
Dept: PODIATRY | Facility: CLINIC | Age: 83
End: 2018-08-14

## 2018-08-14 VITALS — WEIGHT: 108.44 LBS | BODY MASS INDEX: 19.21 KG/M2 | HEIGHT: 63 IN

## 2018-08-14 DIAGNOSIS — B35.1 ONYCHOMYCOSIS DUE TO DERMATOPHYTE: Primary | ICD-10-CM

## 2018-08-14 PROCEDURE — 17999 UNLISTD PX SKN MUC MEMB SUBQ: CPT | Mod: CSM,,, | Performed by: PODIATRIST

## 2018-08-14 PROCEDURE — 99499 UNLISTED E&M SERVICE: CPT | Mod: CSM,,, | Performed by: PODIATRIST

## 2018-08-14 PROCEDURE — 99999 PR PBB SHADOW E&M-EST. PATIENT-LVL III: CPT | Mod: PBBFAC,,, | Performed by: PODIATRIST

## 2018-08-14 NOTE — PROGRESS NOTES
Subjective:      Patient ID: Nikki Jaimes is a 86 y.o. female.    Chief Complaint: Nail Care (PROC B ) and Other Misc (PCP:  Dr Castro 4/9/18)    Nikki is a 86 y.o. female who presents to the clinic complaining of painful long nails, here for Proc B debridement of nails    Review of Systems   Constitution: Positive for weakness. Negative for chills and fever.   Cardiovascular: Negative for claudication and leg swelling.   Respiratory: Negative for shortness of breath.    Skin: Positive for color change and nail changes. Negative for itching and rash.   Musculoskeletal: Negative for muscle cramps, muscle weakness and myalgias.   Gastrointestinal: Negative for nausea and vomiting.   Neurological: Negative for focal weakness, loss of balance, numbness and paresthesias.           Objective:      Physical Exam   Constitutional: She is oriented to person, place, and time. She appears well-developed and well-nourished. No distress.   Cardiovascular:   Pulses:       Dorsalis pedis pulses are 1+ on the right side, and 1+ on the left side.        Posterior tibial pulses are 1+ on the right side, and 1+ on the left side.   < 3 sec capillary refill time to toes 1-5 bilateral. Toes and feet are warm to touch proximally with normal distal cooling b/l. There is no hair growth on the feet and toes b/l. There is minimal edema b/l. There are varicosities present b/l.      Musculoskeletal:   Equinus noted b/l ankles with < 10 deg DF noted. MMT 5/5 in DF/PF/Inv/Ev resistance with no reproduction of pain in any direction. Passive range of motion of ankle and pedal joints is painless b/l.     Neurological: She is alert and oriented to person, place, and time. She has normal strength. She displays no atrophy and no tremor. No sensory deficit. She exhibits normal muscle tone.   Negative tinel sign bilateral.   Skin: Skin is warm, dry and intact. No abrasion, no bruising, no burn, no ecchymosis, no laceration, no lesion, no petechiae  and no rash noted. She is not diaphoretic. No cyanosis or erythema. No pallor. Nails show no clubbing.   Skin temperature, texture and turgor within normal limits and age appropriate.     Nails 1-5 bilateral are thick 3-4 mm, long 3-6 mm, and discolored with subungual debris     Psychiatric: She has a normal mood and affect. Her behavior is normal.           Assessment:       Encounter Diagnosis   Name Primary?    Onychomycosis due to dermatophyte Yes         Plan:       Nikki was seen today for nail care and other misc.    Diagnoses and all orders for this visit:    Onychomycosis due to dermatophyte      I counseled the patient on her conditions, their implications and medical management.    With patient's verbal consent, nails were aggressively reduced and debrided x 10 to their soft tissue attachment mechanically and with electric , removing all offending nail and debris. Patient relates relief following the procedure. No anesthesia or hemostasis required. No blood loss.     Return PRN Proc KELI Hidalgo DPM

## 2018-08-31 RX ORDER — LEVOTHYROXINE SODIUM 50 UG/1
TABLET ORAL
Qty: 30 TABLET | Refills: 9 | Status: SHIPPED | OUTPATIENT
Start: 2018-08-31 | End: 2019-05-02 | Stop reason: SDUPTHER

## 2018-10-09 ENCOUNTER — LAB VISIT (OUTPATIENT)
Dept: LAB | Facility: HOSPITAL | Age: 83
End: 2018-10-09
Attending: INTERNAL MEDICINE
Payer: MEDICARE

## 2018-10-09 DIAGNOSIS — N18.4 CHRONIC KIDNEY DISEASE, STAGE IV (SEVERE): ICD-10-CM

## 2018-10-09 LAB
ALBUMIN SERPL BCP-MCNC: 2.3 G/DL
ANION GAP SERPL CALC-SCNC: 9 MMOL/L
BASOPHILS # BLD AUTO: 0.1 K/UL
BASOPHILS NFR BLD: 1.3 %
BUN SERPL-MCNC: 21 MG/DL
CALCIUM SERPL-MCNC: 8.4 MG/DL
CHLORIDE SERPL-SCNC: 106 MMOL/L
CO2 SERPL-SCNC: 20 MMOL/L
CREAT SERPL-MCNC: 2.1 MG/DL
DIFFERENTIAL METHOD: ABNORMAL
EOSINOPHIL # BLD AUTO: 0.7 K/UL
EOSINOPHIL NFR BLD: 8.8 %
ERYTHROCYTE [DISTWIDTH] IN BLOOD BY AUTOMATED COUNT: 14.1 %
EST. GFR  (AFRICAN AMERICAN): 24 ML/MIN/1.73 M^2
EST. GFR  (NON AFRICAN AMERICAN): 20.9 ML/MIN/1.73 M^2
GLUCOSE SERPL-MCNC: 97 MG/DL
HCT VFR BLD AUTO: 32.1 %
HGB BLD-MCNC: 9.6 G/DL
IMM GRANULOCYTES # BLD AUTO: 0.03 K/UL
IMM GRANULOCYTES NFR BLD AUTO: 0.4 %
LYMPHOCYTES # BLD AUTO: 1.4 K/UL
LYMPHOCYTES NFR BLD: 18.4 %
MCH RBC QN AUTO: 28.9 PG
MCHC RBC AUTO-ENTMCNC: 29.9 G/DL
MCV RBC AUTO: 97 FL
MONOCYTES # BLD AUTO: 0.6 K/UL
MONOCYTES NFR BLD: 7.4 %
NEUTROPHILS # BLD AUTO: 4.8 K/UL
NEUTROPHILS NFR BLD: 63.7 %
NRBC BLD-RTO: 0 /100 WBC
PHOSPHATE SERPL-MCNC: 3.8 MG/DL
PLATELET # BLD AUTO: 395 K/UL
PMV BLD AUTO: 10.3 FL
POTASSIUM SERPL-SCNC: 4.4 MMOL/L
PTH-INTACT SERPL-MCNC: 210 PG/ML
RBC # BLD AUTO: 3.32 M/UL
SODIUM SERPL-SCNC: 135 MMOL/L
WBC # BLD AUTO: 7.6 K/UL

## 2018-10-09 PROCEDURE — 80069 RENAL FUNCTION PANEL: CPT

## 2018-10-09 PROCEDURE — 85025 COMPLETE CBC W/AUTO DIFF WBC: CPT

## 2018-10-09 PROCEDURE — 36415 COLL VENOUS BLD VENIPUNCTURE: CPT | Mod: PO

## 2018-10-09 PROCEDURE — 83970 ASSAY OF PARATHORMONE: CPT

## 2018-10-17 ENCOUNTER — OFFICE VISIT (OUTPATIENT)
Dept: FAMILY MEDICINE | Facility: CLINIC | Age: 83
End: 2018-10-17
Payer: MEDICARE

## 2018-10-17 ENCOUNTER — LAB VISIT (OUTPATIENT)
Dept: LAB | Facility: HOSPITAL | Age: 83
End: 2018-10-17
Attending: FAMILY MEDICINE
Payer: MEDICARE

## 2018-10-17 VITALS
HEART RATE: 80 BPM | WEIGHT: 97 LBS | SYSTOLIC BLOOD PRESSURE: 112 MMHG | DIASTOLIC BLOOD PRESSURE: 80 MMHG | BODY MASS INDEX: 17.19 KG/M2 | HEIGHT: 63 IN

## 2018-10-17 DIAGNOSIS — E46 PROTEIN-CALORIE MALNUTRITION, UNSPECIFIED SEVERITY: ICD-10-CM

## 2018-10-17 DIAGNOSIS — N18.4 CHRONIC KIDNEY DISEASE, STAGE IV (SEVERE): Primary | ICD-10-CM

## 2018-10-17 DIAGNOSIS — E03.9 HYPOTHYROIDISM, UNSPECIFIED TYPE: ICD-10-CM

## 2018-10-17 LAB — TSH SERPL DL<=0.005 MIU/L-ACNC: 2.26 UIU/ML

## 2018-10-17 PROCEDURE — 99999 PR PBB SHADOW E&M-EST. PATIENT-LVL III: CPT | Mod: PBBFAC,,, | Performed by: FAMILY MEDICINE

## 2018-10-17 PROCEDURE — 99214 OFFICE O/P EST MOD 30 MIN: CPT | Mod: S$PBB,,, | Performed by: FAMILY MEDICINE

## 2018-10-17 PROCEDURE — 84443 ASSAY THYROID STIM HORMONE: CPT

## 2018-10-17 PROCEDURE — 99213 OFFICE O/P EST LOW 20 MIN: CPT | Mod: PBBFAC,PO | Performed by: FAMILY MEDICINE

## 2018-10-17 PROCEDURE — 36415 COLL VENOUS BLD VENIPUNCTURE: CPT | Mod: PO

## 2018-10-17 NOTE — PROGRESS NOTES
Subjective:       Patient ID: Nikki Jaimes is a 86 y.o. female    Chief Complaint: Chronic Kidney Disease (here for 6 month f/u. Hm due flu shot (refused))    HPI  Here today for interval evaluation  She has had 10lb weight loss since our last visit.  She has had a decrease in her protein level    Review of Systems     Objective:   Physical Exam   Constitutional: She is oriented to person, place, and time. She appears well-developed and well-nourished.   Neurological: She is alert and oriented to person, place, and time.   Vitals reviewed.    Results for orders placed or performed in visit on 10/09/18   Renal function panel   Result Value Ref Range    Glucose 97 70 - 110 mg/dL    Sodium 135 (L) 136 - 145 mmol/L    Potassium 4.4 3.5 - 5.1 mmol/L    Chloride 106 95 - 110 mmol/L    CO2 20 (L) 23 - 29 mmol/L    BUN, Bld 21 8 - 23 mg/dL    Calcium 8.4 (L) 8.7 - 10.5 mg/dL    Creatinine 2.1 (H) 0.5 - 1.4 mg/dL    Albumin 2.3 (L) 3.5 - 5.2 g/dL    Phosphorus 3.8 2.7 - 4.5 mg/dL    eGFR if African American 24.0 (A) >60 mL/min/1.73 m^2    eGFR if non African American 20.9 (A) >60 mL/min/1.73 m^2    Anion Gap 9 8 - 16 mmol/L   PTH, intact   Result Value Ref Range    PTH, Intact 210.0 (H) 9.0 - 77.0 pg/mL   CBC auto differential   Result Value Ref Range    WBC 7.60 3.90 - 12.70 K/uL    RBC 3.32 (L) 4.00 - 5.40 M/uL    Hemoglobin 9.6 (L) 12.0 - 16.0 g/dL    Hematocrit 32.1 (L) 37.0 - 48.5 %    MCV 97 82 - 98 fL    MCH 28.9 27.0 - 31.0 pg    MCHC 29.9 (L) 32.0 - 36.0 g/dL    RDW 14.1 11.5 - 14.5 %    Platelets 395 (H) 150 - 350 K/uL    MPV 10.3 9.2 - 12.9 fL    Immature Granulocytes 0.4 0.0 - 0.5 %    Gran # (ANC) 4.8 1.8 - 7.7 K/uL    Immature Grans (Abs) 0.03 0.00 - 0.04 K/uL    Lymph # 1.4 1.0 - 4.8 K/uL    Mono # 0.6 0.3 - 1.0 K/uL    Eos # 0.7 (H) 0.0 - 0.5 K/uL    Baso # 0.10 0.00 - 0.20 K/uL    nRBC 0 0 /100 WBC    Gran% 63.7 38.0 - 73.0 %    Lymph% 18.4 18.0 - 48.0 %    Mono% 7.4 4.0 - 15.0 %    Eosinophil% 8.8 (H) 0.0  - 8.0 %    Basophil% 1.3 0.0 - 1.9 %    Differential Method Automated          Assessment:       1. Chronic kidney disease, stage IV (severe)     2. Protein-calorie malnutrition, unspecified severity     3. Hypothyroidism, unspecified type  TSH        Plan:       Chronic kidney disease, stage IV (severe) with Protein-calorie malnutrition, unspecified severity  - Continue Nephrology  - Resume protein supplementation/nutritional supplementation    Hypothyroidism, unspecified type  -     TSH; Future; Expected date: 10/17/2018  - Continue current therapy

## 2018-10-19 ENCOUNTER — TELEPHONE (OUTPATIENT)
Dept: FAMILY MEDICINE | Facility: CLINIC | Age: 83
End: 2018-10-19

## 2018-10-19 NOTE — TELEPHONE ENCOUNTER
----- Message from Marcie Herr sent at 10/19/2018  9:47 AM CDT -----  Contact:    Patient missed a call from your office regarding test results, please call back at [phone] (upff)

## 2018-10-26 ENCOUNTER — OFFICE VISIT (OUTPATIENT)
Dept: NEPHROLOGY | Facility: CLINIC | Age: 83
End: 2018-10-26
Payer: MEDICARE

## 2018-10-26 VITALS
WEIGHT: 97.88 LBS | HEART RATE: 80 BPM | BODY MASS INDEX: 17.34 KG/M2 | OXYGEN SATURATION: 100 % | DIASTOLIC BLOOD PRESSURE: 70 MMHG | HEIGHT: 63 IN | SYSTOLIC BLOOD PRESSURE: 138 MMHG

## 2018-10-26 DIAGNOSIS — N28.1 ACQUIRED CYST OF KIDNEY: ICD-10-CM

## 2018-10-26 DIAGNOSIS — N18.4 CKD (CHRONIC KIDNEY DISEASE) STAGE 4, GFR 15-29 ML/MIN: Primary | ICD-10-CM

## 2018-10-26 DIAGNOSIS — N25.81 HYPERPARATHYROIDISM DUE TO RENAL INSUFFICIENCY: ICD-10-CM

## 2018-10-26 DIAGNOSIS — N20.0 CALCULUS OF KIDNEY: ICD-10-CM

## 2018-10-26 PROCEDURE — 99214 OFFICE O/P EST MOD 30 MIN: CPT | Mod: S$PBB,,, | Performed by: INTERNAL MEDICINE

## 2018-10-26 PROCEDURE — 99213 OFFICE O/P EST LOW 20 MIN: CPT | Mod: PBBFAC,PO | Performed by: INTERNAL MEDICINE

## 2018-10-26 PROCEDURE — 99999 PR PBB SHADOW E&M-EST. PATIENT-LVL III: CPT | Mod: PBBFAC,,, | Performed by: INTERNAL MEDICINE

## 2018-10-26 NOTE — PROGRESS NOTES
"Subjective:       Patient ID: Nikki Jaimes is a 86 y.o. White female who presents for return patient evaluation for chronic renal failure.    She has no uremic or urinary symptoms and is in her usual state of health.  There have been no recent illnesses, hospitalizations or procedures.  She has lost 11 pounds in the past year.      Review of Systems   Constitutional: Positive for fatigue. Negative for appetite change, chills, fever and unexpected weight change.   Eyes: Negative for visual disturbance.   Respiratory: Negative for cough and shortness of breath.    Cardiovascular: Negative for chest pain and leg swelling.   Gastrointestinal: Negative for abdominal pain, diarrhea, nausea and vomiting.   Genitourinary: Negative for difficulty urinating, dysuria and hematuria.   Musculoskeletal: Positive for gait problem. Negative for back pain and myalgias.   Skin: Negative for rash.   Neurological: Negative for weakness and headaches.   Psychiatric/Behavioral: Negative for sleep disturbance.       The past medical, family and social histories were reviewed for this encounter.     /70   Pulse 80   Ht 5' 3" (1.6 m)   Wt 44.4 kg (97 lb 14.2 oz)   SpO2 100%   BMI 17.34 kg/m²     Objective:      Physical Exam   Constitutional: She appears well-developed and well-nourished. No distress.   HENT:   Head: Normocephalic and atraumatic.   Eyes: Conjunctivae are normal. No scleral icterus.   Neck: Normal range of motion. No JVD present.   Cardiovascular: Normal rate, regular rhythm and normal heart sounds. Exam reveals no gallop and no friction rub.   No murmur heard.  Pulmonary/Chest: Effort normal and breath sounds normal. No respiratory distress. She has no wheezes.   Abdominal: Soft. Bowel sounds are normal. She exhibits no distension. There is no tenderness.   Musculoskeletal: She exhibits no edema.   Skin: Skin is warm and dry. No rash noted.   Psychiatric: She has a normal mood and affect.   Vitals reviewed.    "   Assessment:       1. CKD (chronic kidney disease) stage 4, GFR 15-29 ml/min    2. Hyperparathyroidism due to renal insufficiency    3. Calculus of kidney    4. Acquired cyst of kidney        Plan:   Return to clinic in 6 months.  Labs for next visit include rp, pth, cbc.  Baseline creatinine is 2.3 since 2012.  PTH is 210 with a calcium of 8.4.  UPC is 0.38.  Blood pressure is controlled on the current regimen.  Continue current medications as prescribed and reviewed.   We discussed her eating more frequent meals and high calorie snacks to prevent further weight loss.

## 2019-04-17 ENCOUNTER — TELEPHONE (OUTPATIENT)
Dept: FAMILY MEDICINE | Facility: CLINIC | Age: 84
End: 2019-04-17

## 2019-04-17 ENCOUNTER — LAB VISIT (OUTPATIENT)
Dept: LAB | Facility: HOSPITAL | Age: 84
End: 2019-04-17
Attending: INTERNAL MEDICINE
Payer: MEDICARE

## 2019-04-17 ENCOUNTER — OFFICE VISIT (OUTPATIENT)
Dept: FAMILY MEDICINE | Facility: CLINIC | Age: 84
End: 2019-04-17
Payer: MEDICARE

## 2019-04-17 VITALS
WEIGHT: 87.06 LBS | SYSTOLIC BLOOD PRESSURE: 118 MMHG | OXYGEN SATURATION: 97 % | DIASTOLIC BLOOD PRESSURE: 62 MMHG | BODY MASS INDEX: 15.43 KG/M2 | HEART RATE: 68 BPM

## 2019-04-17 DIAGNOSIS — N28.9 MALNUTRITION DUE TO RENAL DISEASE: ICD-10-CM

## 2019-04-17 DIAGNOSIS — E46 MALNUTRITION DUE TO RENAL DISEASE: ICD-10-CM

## 2019-04-17 DIAGNOSIS — N18.4 CKD (CHRONIC KIDNEY DISEASE) STAGE 4, GFR 15-29 ML/MIN: ICD-10-CM

## 2019-04-17 DIAGNOSIS — R63.4 LOSS OF WEIGHT: Primary | ICD-10-CM

## 2019-04-17 DIAGNOSIS — R63.4 LOSS OF WEIGHT: ICD-10-CM

## 2019-04-17 LAB
ALBUMIN SERPL BCP-MCNC: 2.2 G/DL (ref 3.5–5.2)
ALBUMIN SERPL BCP-MCNC: 2.2 G/DL (ref 3.5–5.2)
ALP SERPL-CCNC: 66 U/L (ref 55–135)
ALT SERPL W/O P-5'-P-CCNC: 6 U/L (ref 10–44)
ANION GAP SERPL CALC-SCNC: 7 MMOL/L (ref 8–16)
ANION GAP SERPL CALC-SCNC: 7 MMOL/L (ref 8–16)
AST SERPL-CCNC: 11 U/L (ref 10–40)
BASOPHILS # BLD AUTO: 0.06 K/UL (ref 0–0.2)
BASOPHILS NFR BLD: 1 % (ref 0–1.9)
BILIRUB SERPL-MCNC: 0.3 MG/DL (ref 0.1–1)
BUN SERPL-MCNC: 33 MG/DL (ref 8–23)
BUN SERPL-MCNC: 33 MG/DL (ref 8–23)
CALCIUM SERPL-MCNC: 8 MG/DL (ref 8.7–10.5)
CALCIUM SERPL-MCNC: 8.1 MG/DL (ref 8.7–10.5)
CHLORIDE SERPL-SCNC: 111 MMOL/L (ref 95–110)
CHLORIDE SERPL-SCNC: 111 MMOL/L (ref 95–110)
CO2 SERPL-SCNC: 17 MMOL/L (ref 23–29)
CO2 SERPL-SCNC: 17 MMOL/L (ref 23–29)
CREAT SERPL-MCNC: 2.8 MG/DL (ref 0.5–1.4)
CREAT SERPL-MCNC: 2.8 MG/DL (ref 0.5–1.4)
DIFFERENTIAL METHOD: ABNORMAL
EOSINOPHIL # BLD AUTO: 0.2 K/UL (ref 0–0.5)
EOSINOPHIL NFR BLD: 3.9 % (ref 0–8)
ERYTHROCYTE [DISTWIDTH] IN BLOOD BY AUTOMATED COUNT: 17.9 % (ref 11.5–14.5)
EST. GFR  (AFRICAN AMERICAN): 16.9 ML/MIN/1.73 M^2
EST. GFR  (AFRICAN AMERICAN): 16.9 ML/MIN/1.73 M^2
EST. GFR  (NON AFRICAN AMERICAN): 14.6 ML/MIN/1.73 M^2
EST. GFR  (NON AFRICAN AMERICAN): 14.6 ML/MIN/1.73 M^2
GLUCOSE SERPL-MCNC: 110 MG/DL (ref 70–110)
GLUCOSE SERPL-MCNC: 112 MG/DL (ref 70–110)
HCT VFR BLD AUTO: 28 % (ref 37–48.5)
HGB BLD-MCNC: 8.9 G/DL (ref 12–16)
IMM GRANULOCYTES # BLD AUTO: 0.04 K/UL (ref 0–0.04)
IMM GRANULOCYTES NFR BLD AUTO: 0.6 % (ref 0–0.5)
LYMPHOCYTES # BLD AUTO: 1.4 K/UL (ref 1–4.8)
LYMPHOCYTES NFR BLD: 23.2 % (ref 18–48)
MCH RBC QN AUTO: 30.1 PG (ref 27–31)
MCHC RBC AUTO-ENTMCNC: 31.8 G/DL (ref 32–36)
MCV RBC AUTO: 95 FL (ref 82–98)
MONOCYTES # BLD AUTO: 0.6 K/UL (ref 0.3–1)
MONOCYTES NFR BLD: 9 % (ref 4–15)
NEUTROPHILS # BLD AUTO: 3.9 K/UL (ref 1.8–7.7)
NEUTROPHILS NFR BLD: 62.3 % (ref 38–73)
NRBC BLD-RTO: 1 /100 WBC
PHOSPHATE SERPL-MCNC: 3.9 MG/DL (ref 2.7–4.5)
PLATELET # BLD AUTO: 253 K/UL (ref 150–350)
PMV BLD AUTO: 10.4 FL (ref 9.2–12.9)
POTASSIUM SERPL-SCNC: 4.3 MMOL/L (ref 3.5–5.1)
POTASSIUM SERPL-SCNC: 4.4 MMOL/L (ref 3.5–5.1)
PROT SERPL-MCNC: 6.5 G/DL (ref 6–8.4)
PTH-INTACT SERPL-MCNC: 261 PG/ML (ref 9–77)
RBC # BLD AUTO: 2.96 M/UL (ref 4–5.4)
SODIUM SERPL-SCNC: 135 MMOL/L (ref 136–145)
SODIUM SERPL-SCNC: 135 MMOL/L (ref 136–145)
TSH SERPL DL<=0.005 MIU/L-ACNC: 3.97 UIU/ML (ref 0.4–4)
WBC # BLD AUTO: 6.22 K/UL (ref 3.9–12.7)

## 2019-04-17 PROCEDURE — 84443 ASSAY THYROID STIM HORMONE: CPT

## 2019-04-17 PROCEDURE — 99214 OFFICE O/P EST MOD 30 MIN: CPT | Mod: S$PBB,,, | Performed by: FAMILY MEDICINE

## 2019-04-17 PROCEDURE — 99999 PR PBB SHADOW E&M-EST. PATIENT-LVL III: CPT | Mod: PBBFAC,,, | Performed by: FAMILY MEDICINE

## 2019-04-17 PROCEDURE — 99214 PR OFFICE/OUTPT VISIT, EST, LEVL IV, 30-39 MIN: ICD-10-PCS | Mod: S$PBB,,, | Performed by: FAMILY MEDICINE

## 2019-04-17 PROCEDURE — 36415 COLL VENOUS BLD VENIPUNCTURE: CPT | Mod: PO

## 2019-04-17 PROCEDURE — 80069 RENAL FUNCTION PANEL: CPT

## 2019-04-17 PROCEDURE — 83970 ASSAY OF PARATHORMONE: CPT

## 2019-04-17 PROCEDURE — 80053 COMPREHEN METABOLIC PANEL: CPT

## 2019-04-17 PROCEDURE — 99999 PR PBB SHADOW E&M-EST. PATIENT-LVL III: ICD-10-PCS | Mod: PBBFAC,,, | Performed by: FAMILY MEDICINE

## 2019-04-17 PROCEDURE — 99213 OFFICE O/P EST LOW 20 MIN: CPT | Mod: PBBFAC,PO | Performed by: FAMILY MEDICINE

## 2019-04-17 PROCEDURE — 85025 COMPLETE CBC W/AUTO DIFF WBC: CPT

## 2019-04-17 RX ORDER — MEGESTROL ACETATE 40 MG/1
40 TABLET ORAL DAILY
Qty: 30 TABLET | Refills: 3 | Status: SHIPPED | OUTPATIENT
Start: 2019-04-17 | End: 2020-01-03

## 2019-04-17 NOTE — TELEPHONE ENCOUNTER
----- Message from Sofy Baca sent at 4/17/2019  3:53 PM CDT -----  Contact: self at time of check out  according to the check out note Dr. Castro wants to see this patient in 8 weeks. There is nothing available.  Please contact the patient.

## 2019-04-17 NOTE — PROGRESS NOTES
Subjective:       Patient ID: Nikki Jaimse is a 87 y.o. female    Chief Complaint: Follow-up    HPI  Here today for interval evaluation  Since our last visit she underwent treatment for shingles with resolution.    Since that time she has had increased lethargy.  She also had one episode of syncope.  She has had heat intolerance.  She reports decreased appetite with early satiety and 10lb weight loss (10% of body weight)    Review of Systems     Objective:   Physical Exam   Constitutional: She appears well-developed and well-nourished.   HENT:   Head: Normocephalic and atraumatic.   Eyes: Pupils are equal, round, and reactive to light. Conjunctivae and EOM are normal. No scleral icterus.   Neck: Normal range of motion. Neck supple. No thyromegaly present.   Cardiovascular: Normal rate, regular rhythm and normal heart sounds. Exam reveals no gallop and no friction rub.   No murmur heard.  Pulmonary/Chest: Effort normal and breath sounds normal. No respiratory distress. She has no wheezes. She has no rales.   Lymphadenopathy:     She has no cervical adenopathy.   Vitals reviewed.       Assessment:       1. Loss of weight  Comprehensive metabolic panel    CBC auto differential    TSH   2. Malnutrition due to renal disease  megestrol (MEGACE) 40 MG Tab        Plan:       Loss of weight  -     Comprehensive metabolic panel; Future; Expected date: 04/17/2019  -     CBC auto differential; Future; Expected date: 04/17/2019  -     TSH; Future; Expected date: 04/17/2019    Malnutrition due to renal disease  -     ADD megestrol (MEGACE) 40 MG Tab; Take 1 tablet (40 mg total) by mouth once daily.  Dispense: 30 tablet; Refill: 3  - Continue nephrology  - Follow up in about 8 weeks (around 6/12/2019).

## 2019-04-18 ENCOUNTER — TELEPHONE (OUTPATIENT)
Dept: FAMILY MEDICINE | Facility: CLINIC | Age: 84
End: 2019-04-18

## 2019-04-18 NOTE — TELEPHONE ENCOUNTER
Spoke to pt. Pt was wondering what medication she was prescribed yesterday and what it was for. Advised pt that it was called Megace and it was to help with her appetite. Pt verbalized understanding.

## 2019-04-18 NOTE — TELEPHONE ENCOUNTER
----- Message from Biri Albarado sent at 4/18/2019 10:44 AM CDT -----  Contact: spouse, Prabhakar Jaimes  Type:  Patient Returning Call    Who Called:  teo  Who Left Message for Patient:  Pilar  Does the patient know what this is regarding?:  yes  Best Call Back Number:  [phone]  Additional Information:  Patient's spouse states she is calling regarding an appointment. Please call spouse. Thanks!

## 2019-04-29 ENCOUNTER — OFFICE VISIT (OUTPATIENT)
Dept: NEPHROLOGY | Facility: CLINIC | Age: 84
End: 2019-04-29
Payer: MEDICARE

## 2019-04-29 VITALS
DIASTOLIC BLOOD PRESSURE: 80 MMHG | SYSTOLIC BLOOD PRESSURE: 122 MMHG | WEIGHT: 90.63 LBS | OXYGEN SATURATION: 93 % | HEART RATE: 96 BPM | HEIGHT: 63 IN | BODY MASS INDEX: 16.06 KG/M2

## 2019-04-29 DIAGNOSIS — N18.4 CKD (CHRONIC KIDNEY DISEASE) STAGE 4, GFR 15-29 ML/MIN: Primary | ICD-10-CM

## 2019-04-29 DIAGNOSIS — N20.0 CALCULUS OF KIDNEY: ICD-10-CM

## 2019-04-29 DIAGNOSIS — N25.81 HYPERPARATHYROIDISM DUE TO RENAL INSUFFICIENCY: ICD-10-CM

## 2019-04-29 DIAGNOSIS — N28.1 ACQUIRED CYST OF KIDNEY: ICD-10-CM

## 2019-04-29 PROCEDURE — 99214 PR OFFICE/OUTPT VISIT, EST, LEVL IV, 30-39 MIN: ICD-10-PCS | Mod: S$PBB,,, | Performed by: INTERNAL MEDICINE

## 2019-04-29 PROCEDURE — 99999 PR PBB SHADOW E&M-EST. PATIENT-LVL III: CPT | Mod: PBBFAC,,, | Performed by: INTERNAL MEDICINE

## 2019-04-29 PROCEDURE — 99214 OFFICE O/P EST MOD 30 MIN: CPT | Mod: S$PBB,,, | Performed by: INTERNAL MEDICINE

## 2019-04-29 PROCEDURE — 99999 PR PBB SHADOW E&M-EST. PATIENT-LVL III: ICD-10-PCS | Mod: PBBFAC,,, | Performed by: INTERNAL MEDICINE

## 2019-04-29 PROCEDURE — 99213 OFFICE O/P EST LOW 20 MIN: CPT | Mod: PBBFAC,PO | Performed by: INTERNAL MEDICINE

## 2019-04-29 RX ORDER — BRIMONIDINE TARTRATE, TIMOLOL MALEATE 2; 5 MG/ML; MG/ML
SOLUTION/ DROPS OPHTHALMIC
Refills: 3 | COMMUNITY
Start: 2019-04-08 | End: 2019-11-20 | Stop reason: ALTCHOICE

## 2019-04-29 RX ORDER — VALACYCLOVIR HYDROCHLORIDE 1 G/1
TABLET, FILM COATED ORAL
Refills: 0 | COMMUNITY
Start: 2019-04-10 | End: 2019-04-29 | Stop reason: ALTCHOICE

## 2019-04-29 RX ORDER — CALCITRIOL 0.5 UG/1
0.5 CAPSULE ORAL DAILY
Qty: 90 CAPSULE | Refills: 1 | Status: SHIPPED | OUTPATIENT
Start: 2019-04-29 | End: 2020-01-03

## 2019-04-29 NOTE — PROGRESS NOTES
"Subjective:       Patient ID: Nikki Jaimes is a 87 y.o. White female who presents for return patient evaluation for chronic renal failure.    She has no uremic or urinary symptoms and is in her usual state of health.  There have been no recent illnesses, hospitalizations or procedures.  She recently had shingles on the left side of her face.  She has gained 3 pounds on megace recently.      Review of Systems   Constitutional: Positive for fatigue. Negative for appetite change, chills, fever and unexpected weight change.   Eyes: Negative for visual disturbance.   Respiratory: Negative for cough and shortness of breath.    Cardiovascular: Negative for chest pain and leg swelling.   Gastrointestinal: Negative for abdominal pain, diarrhea, nausea and vomiting.   Genitourinary: Negative for difficulty urinating, dysuria and hematuria.   Musculoskeletal: Positive for gait problem. Negative for back pain and myalgias.   Skin: Negative for rash.   Neurological: Negative for weakness and headaches.   Psychiatric/Behavioral: Negative for sleep disturbance.       The past medical, family and social histories were reviewed for this encounter.     /80 (BP Location: Right arm, Patient Position: Sitting, BP Method: Medium (Manual))   Pulse 96   Ht 5' 3" (1.6 m)   Wt 41.1 kg (90 lb 9.7 oz)   SpO2 (!) 93%   BMI 16.05 kg/m²     Objective:      Physical Exam   Constitutional: She appears well-developed and well-nourished. No distress.   HENT:   Head: Normocephalic and atraumatic.   Eyes: Conjunctivae are normal. No scleral icterus.   Neck: Normal range of motion. No JVD present.   Cardiovascular: Normal rate, regular rhythm and normal heart sounds. Exam reveals no gallop and no friction rub.   No murmur heard.  Pulmonary/Chest: Effort normal and breath sounds normal. No respiratory distress. She has no wheezes.   Abdominal: Soft. Bowel sounds are normal. She exhibits no distension. There is no tenderness. "   Musculoskeletal: She exhibits no edema.   Skin: Skin is warm and dry. No rash noted.   Psychiatric: She has a normal mood and affect.   Vitals reviewed.      Assessment:       1. CKD (chronic kidney disease) stage 4, GFR 15-29 ml/min    2. Hyperparathyroidism due to renal insufficiency    3. Calculus of kidney    4. Acquired cyst of kidney        Plan:   Return to clinic in 6 months.  Labs for next visit include rp, pth.  Baseline creatinine is 2.3 since 2012.  Conservative management as she does not wish to do dialysis.  PTH is 261 with a calcium of 8.1.  Increase calcitriol to 0.5 mcg daily.  UPC is 0.38.  Blood pressure is controlled on the current regimen.  Continue current medications as prescribed and reviewed.   We discussed her eating more frequent meals and high calorie snacks to prevent further weight loss.

## 2019-04-30 ENCOUNTER — TELEPHONE (OUTPATIENT)
Dept: NEPHROLOGY | Facility: CLINIC | Age: 84
End: 2019-04-30

## 2019-04-30 NOTE — TELEPHONE ENCOUNTER
----- Message from Lynn Walton sent at 4/30/2019 10:44 AM CDT -----  Contact: pt   Calling for copy of lab work to be mailed to him at address and please advise [phone] (home)

## 2019-04-30 NOTE — TELEPHONE ENCOUNTER
Spoke to the pt's  and I told him was going to mail his wife's lab results she requested from 4-17-19.

## 2019-05-05 RX ORDER — LEVOTHYROXINE SODIUM 50 UG/1
TABLET ORAL
Qty: 30 TABLET | Refills: 11 | Status: SHIPPED | OUTPATIENT
Start: 2019-05-05 | End: 2020-03-03

## 2019-05-06 RX ORDER — CARVEDILOL 12.5 MG/1
TABLET ORAL
Qty: 90 TABLET | Refills: 3 | Status: SHIPPED | OUTPATIENT
Start: 2019-05-06

## 2019-05-29 ENCOUNTER — OFFICE VISIT (OUTPATIENT)
Dept: PODIATRY | Facility: CLINIC | Age: 84
End: 2019-05-29
Payer: MEDICARE

## 2019-05-29 VITALS
DIASTOLIC BLOOD PRESSURE: 71 MMHG | HEIGHT: 63 IN | HEART RATE: 86 BPM | BODY MASS INDEX: 15.95 KG/M2 | SYSTOLIC BLOOD PRESSURE: 137 MMHG | WEIGHT: 90 LBS

## 2019-05-29 DIAGNOSIS — L84 CORN OR CALLUS: ICD-10-CM

## 2019-05-29 DIAGNOSIS — B35.1 ONYCHOMYCOSIS DUE TO DERMATOPHYTE: Primary | ICD-10-CM

## 2019-05-29 PROCEDURE — 17999 PR NON-COVERED FOOT CARE: ICD-10-PCS | Mod: CSM,,, | Performed by: PODIATRIST

## 2019-05-29 PROCEDURE — 99499 UNLISTED E&M SERVICE: CPT | Mod: ,,, | Performed by: PODIATRIST

## 2019-05-29 PROCEDURE — 99499 NO LOS: ICD-10-PCS | Mod: ,,, | Performed by: PODIATRIST

## 2019-05-29 PROCEDURE — 99999 PR PBB SHADOW E&M-EST. PATIENT-LVL III: CPT | Mod: PBBFAC,,, | Performed by: PODIATRIST

## 2019-05-29 PROCEDURE — 17999 UNLISTD PX SKN MUC MEMB SUBQ: CPT | Mod: CSM,,, | Performed by: PODIATRIST

## 2019-05-29 PROCEDURE — 99999 PR PBB SHADOW E&M-EST. PATIENT-LVL III: ICD-10-PCS | Mod: PBBFAC,,, | Performed by: PODIATRIST

## 2019-05-29 PROCEDURE — 99213 OFFICE O/P EST LOW 20 MIN: CPT | Mod: PBBFAC,PN | Performed by: PODIATRIST

## 2019-05-29 NOTE — PROGRESS NOTES
Subjective:      Patient ID: Nikki Jaimes is a 87 y.o. female.    Chief Complaint: Foot Problem (right foot - tender growths on great toe and little toe)    Nikki is a 87 y.o. female who presents to the clinic complaining of painful long nails, here for Proc B debridement of nails and callus trimming    Review of Systems   Constitution: Negative for chills and fever.   Cardiovascular: Negative for claudication and leg swelling.   Respiratory: Negative for shortness of breath.    Skin: Positive for color change and nail changes. Negative for itching and rash.   Musculoskeletal: Negative for muscle cramps, muscle weakness and myalgias.   Gastrointestinal: Negative for nausea and vomiting.   Neurological: Positive for weakness. Negative for focal weakness, loss of balance, numbness and paresthesias.           Objective:      Physical Exam   Constitutional: She is oriented to person, place, and time. She appears well-developed and well-nourished. No distress.   Cardiovascular:   Pulses:       Dorsalis pedis pulses are 1+ on the right side, and 1+ on the left side.        Posterior tibial pulses are 1+ on the right side, and 1+ on the left side.   < 3 sec capillary refill time to toes 1-5 bilateral. Toes and feet are warm to touch proximally with normal distal cooling b/l. There is no hair growth on the feet and toes b/l. There is minimal edema b/l. There are varicosities present b/l.      Musculoskeletal:   Equinus noted b/l ankles with < 10 deg DF noted. MMT 5/5 in DF/PF/Inv/Ev resistance with no reproduction of pain in any direction. Passive range of motion of ankle and pedal joints is painless b/l.     Neurological: She is alert and oriented to person, place, and time. She has normal strength. She displays no atrophy and no tremor. No sensory deficit. She exhibits normal muscle tone.   Negative tinel sign bilateral.   Skin: Skin is warm, dry and intact. No abrasion, no bruising, no burn, no ecchymosis, no  laceration, no lesion, no petechiae and no rash noted. She is not diaphoretic. No cyanosis or erythema. No pallor. Nails show no clubbing.   Skin temperature, texture and turgor within normal limits and age appropriate.     Nails 1-5 bilateral are thick 3-4 mm, long 3-6 mm, and discolored with subungual debris    Right medial hallux and lateral fifth toe focal hyperkeratotic lesions are present.         Psychiatric: She has a normal mood and affect. Her behavior is normal.           Assessment:       Encounter Diagnoses   Name Primary?    Onychomycosis due to dermatophyte Yes    Corn or callus          Plan:       Nikki was seen today for foot problem.    Diagnoses and all orders for this visit:    Onychomycosis due to dermatophyte    Corn or callus      I counseled the patient on her conditions, their implications and medical management.    With patient's verbal consent, nails were aggressively reduced and debrided x 10 to their soft tissue attachment mechanically and with electric , removing all offending nail and debris. Patient relates relief following the procedure. No anesthesia or hemostasis required. No blood loss.     With patient's verbal consent the hyperkeratotic lesions right foot were trimmed to healthy appearing skin using a # 15 scalpel. Patient relates relief of pain following the procedure. Patient tolerated the procedure well minor cut to the right hallux while trimming the callus superficial, covered in neosporin and band aid. They will do the same until healed likely a couple days    Return PRN Nay Hidalgo DPM

## 2019-06-28 ENCOUNTER — OFFICE VISIT (OUTPATIENT)
Dept: FAMILY MEDICINE | Facility: CLINIC | Age: 84
End: 2019-06-28
Payer: MEDICARE

## 2019-06-28 VITALS
SYSTOLIC BLOOD PRESSURE: 130 MMHG | HEIGHT: 63 IN | BODY MASS INDEX: 16.83 KG/M2 | HEART RATE: 72 BPM | WEIGHT: 95 LBS | DIASTOLIC BLOOD PRESSURE: 78 MMHG

## 2019-06-28 DIAGNOSIS — E46 PROTEIN-CALORIE MALNUTRITION, UNSPECIFIED SEVERITY: Primary | ICD-10-CM

## 2019-06-28 PROCEDURE — 99213 PR OFFICE/OUTPT VISIT, EST, LEVL III, 20-29 MIN: ICD-10-PCS | Mod: S$PBB,,, | Performed by: FAMILY MEDICINE

## 2019-06-28 PROCEDURE — 99213 OFFICE O/P EST LOW 20 MIN: CPT | Mod: PBBFAC,PO | Performed by: FAMILY MEDICINE

## 2019-06-28 PROCEDURE — 99213 OFFICE O/P EST LOW 20 MIN: CPT | Mod: S$PBB,,, | Performed by: FAMILY MEDICINE

## 2019-06-28 PROCEDURE — 99999 PR PBB SHADOW E&M-EST. PATIENT-LVL III: CPT | Mod: PBBFAC,,, | Performed by: FAMILY MEDICINE

## 2019-06-28 PROCEDURE — 99999 PR PBB SHADOW E&M-EST. PATIENT-LVL III: ICD-10-PCS | Mod: PBBFAC,,, | Performed by: FAMILY MEDICINE

## 2019-06-28 NOTE — PROGRESS NOTES
Subjective:       Patient ID: Nikki Jaimes is a 87 y.o. female    Chief Complaint: Weight Loss (her for f/u. )    HPI  Here today to review recent weight loss.  She is significantly improved with Megace.  She has no further syncope and improved depression    Review of Systems     Objective:   Physical Exam   Constitutional: She is oriented to person, place, and time. She appears well-developed and well-nourished.   Neurological: She is alert and oriented to person, place, and time.   Vitals reviewed.       Assessment:       1. Protein-calorie malnutrition, unspecified severity          Plan:       Protein-calorie malnutrition, unspecified severity  - Continue Megace  - Follow up in about 6 months (around 12/28/2019).

## 2019-07-11 ENCOUNTER — TELEPHONE (OUTPATIENT)
Dept: FAMILY MEDICINE | Facility: CLINIC | Age: 84
End: 2019-07-11

## 2019-07-11 RX ORDER — ESCITALOPRAM OXALATE 5 MG/5ML
10 SOLUTION ORAL DAILY
Qty: 300 ML | Refills: 11 | Status: SHIPPED | OUTPATIENT
Start: 2019-07-11 | End: 2019-08-27 | Stop reason: SDUPTHER

## 2019-07-11 RX ORDER — ESCITALOPRAM OXALATE 10 MG/1
10 TABLET ORAL DAILY
Qty: 90 TABLET | Refills: 3 | Status: SHIPPED | OUTPATIENT
Start: 2019-07-11 | End: 2019-07-11

## 2019-07-11 NOTE — TELEPHONE ENCOUNTER
Spoke to pt . Pt  is wondering if pt could get a prescription for anxiety. States she gets really anxious. Please advise.

## 2019-07-12 ENCOUNTER — TELEPHONE (OUTPATIENT)
Dept: FAMILY MEDICINE | Facility: CLINIC | Age: 84
End: 2019-07-12

## 2019-07-12 NOTE — TELEPHONE ENCOUNTER
----- Message from Zoraida Orr sent at 7/11/2019  4:10 PM CDT -----  Type:  Patient Returning Call    Who Called:    (Prabhakar)  Who Left Message for Patient:  Lydia Walton  Does the patient know what this is regarding?: Medication  Best Call Back Number:  [phone]  Additional Information:

## 2019-07-31 ENCOUNTER — TELEPHONE (OUTPATIENT)
Dept: NEPHROLOGY | Facility: CLINIC | Age: 84
End: 2019-07-31

## 2019-07-31 NOTE — TELEPHONE ENCOUNTER
----- Message from Rigoberto Tavares sent at 7/31/2019  4:16 PM CDT -----  Contact: pt  Prabhakar Radford called to get advice on an antibiotic that was given to the pt for a sinus infection. Please call to advise.    Call Back #: [phone]  Thanks

## 2019-07-31 NOTE — TELEPHONE ENCOUNTER
Ws rx'd amox-clav ES 600mg/5ml susp  75ml    5ml by mouth twice daily.  No number of days.      Please advise.  Hoping to start it soon for sinus infection. Told him I would call him in the am.

## 2019-08-23 ENCOUNTER — TELEPHONE (OUTPATIENT)
Dept: PODIATRY | Facility: CLINIC | Age: 84
End: 2019-08-23

## 2019-08-23 NOTE — TELEPHONE ENCOUNTER
----- Message from Alo Aguilar sent at 8/23/2019 11:44 AM CDT -----  Contact: pt's  Prabhakar  Type:  Same Day Appointment Request    Caller is requesting a same day appointment.  Caller declined first available appointment listed below.      Name of Caller:  Prabhakar  When is the first available appointment?  8/26/19  Symptoms:  pt needs a toe on her right foot looked it. It is swollen and red  Best Call Back Number:  [phone]  Additional Information:

## 2019-08-27 ENCOUNTER — OFFICE VISIT (OUTPATIENT)
Dept: PODIATRY | Facility: CLINIC | Age: 84
End: 2019-08-27
Payer: MEDICARE

## 2019-08-27 ENCOUNTER — TELEPHONE (OUTPATIENT)
Dept: PODIATRY | Facility: CLINIC | Age: 84
End: 2019-08-27

## 2019-08-27 VITALS — HEIGHT: 63 IN | BODY MASS INDEX: 16.83 KG/M2 | WEIGHT: 95 LBS

## 2019-08-27 DIAGNOSIS — L97.512 SKIN ULCER OF SECOND TOE OF RIGHT FOOT WITH FAT LAYER EXPOSED: Primary | ICD-10-CM

## 2019-08-27 PROCEDURE — 99214 PR OFFICE/OUTPT VISIT, EST, LEVL IV, 30-39 MIN: ICD-10-PCS | Mod: S$PBB,,, | Performed by: PODIATRIST

## 2019-08-27 PROCEDURE — 87070 CULTURE OTHR SPECIMN AEROBIC: CPT

## 2019-08-27 PROCEDURE — 99999 PR PBB SHADOW E&M-EST. PATIENT-LVL III: CPT | Mod: PBBFAC,,, | Performed by: PODIATRIST

## 2019-08-27 PROCEDURE — 99214 OFFICE O/P EST MOD 30 MIN: CPT | Mod: S$PBB,,, | Performed by: PODIATRIST

## 2019-08-27 PROCEDURE — 99999 PR PBB SHADOW E&M-EST. PATIENT-LVL III: ICD-10-PCS | Mod: PBBFAC,,, | Performed by: PODIATRIST

## 2019-08-27 PROCEDURE — 99213 OFFICE O/P EST LOW 20 MIN: CPT | Mod: PBBFAC,PN | Performed by: PODIATRIST

## 2019-08-27 PROCEDURE — 87075 CULTR BACTERIA EXCEPT BLOOD: CPT

## 2019-08-27 RX ORDER — ESCITALOPRAM OXALATE 10 MG/1
10 TABLET ORAL DAILY
Refills: 3 | COMMUNITY
Start: 2019-07-11 | End: 2020-01-03

## 2019-08-27 RX ORDER — GANCICLOVIR 1.5 MG/G
GEL OPHTHALMIC
COMMUNITY
Start: 2019-08-26 | End: 2019-11-20 | Stop reason: ALTCHOICE

## 2019-08-27 NOTE — TELEPHONE ENCOUNTER
Stated no one in the area has the medication which was prescribed.  Asking for an alternative.  Please advise.

## 2019-08-31 LAB — BACTERIA SPEC AEROBE CULT: NO GROWTH

## 2019-09-03 ENCOUNTER — OFFICE VISIT (OUTPATIENT)
Dept: PODIATRY | Facility: CLINIC | Age: 84
End: 2019-09-03
Payer: MEDICARE

## 2019-09-03 VITALS
HEART RATE: 81 BPM | WEIGHT: 95 LBS | SYSTOLIC BLOOD PRESSURE: 148 MMHG | BODY MASS INDEX: 16.83 KG/M2 | DIASTOLIC BLOOD PRESSURE: 69 MMHG | HEIGHT: 63 IN

## 2019-09-03 DIAGNOSIS — I73.9 PERIPHERAL VASCULAR DISEASE: ICD-10-CM

## 2019-09-03 DIAGNOSIS — L97.512 SKIN ULCER OF SECOND TOE OF RIGHT FOOT WITH FAT LAYER EXPOSED: Primary | ICD-10-CM

## 2019-09-03 PROCEDURE — 99212 OFFICE O/P EST SF 10 MIN: CPT | Mod: S$PBB,,, | Performed by: PODIATRIST

## 2019-09-03 PROCEDURE — 99212 PR OFFICE/OUTPT VISIT, EST, LEVL II, 10-19 MIN: ICD-10-PCS | Mod: S$PBB,,, | Performed by: PODIATRIST

## 2019-09-03 PROCEDURE — 99213 OFFICE O/P EST LOW 20 MIN: CPT | Mod: PBBFAC,PN | Performed by: PODIATRIST

## 2019-09-03 PROCEDURE — 99999 PR PBB SHADOW E&M-EST. PATIENT-LVL III: ICD-10-PCS | Mod: PBBFAC,,, | Performed by: PODIATRIST

## 2019-09-03 PROCEDURE — 99999 PR PBB SHADOW E&M-EST. PATIENT-LVL III: CPT | Mod: PBBFAC,,, | Performed by: PODIATRIST

## 2019-09-04 LAB — BACTERIA SPEC ANAEROBE CULT: NORMAL

## 2019-09-08 NOTE — PROGRESS NOTES
Subjective:      Patient ID: Nikki Jaimes is a 87 y.o. female.    Chief Complaint: Foot Problem (redness - right 2nd) and Other Misc (Dr Castro  6/2019)    Nikki is a 87 y.o. female who presents to the clinic complaining of a new sore on the right second toe dorsal aspect. There has been pain to the toe and she noticed some drainage from the area. There is also redness to the toe. No prior treatments.    Review of Systems   Constitution: Negative for chills and fever.   Cardiovascular: Negative for claudication and leg swelling.   Respiratory: Negative for shortness of breath.    Skin: Positive for color change and nail changes. Negative for itching and rash.   Musculoskeletal: Negative for muscle cramps, muscle weakness and myalgias.   Gastrointestinal: Negative for nausea and vomiting.   Neurological: Positive for weakness. Negative for focal weakness, loss of balance, numbness and paresthesias.           Objective:      Physical Exam   Constitutional: She is oriented to person, place, and time. She appears well-developed and well-nourished. No distress.   Cardiovascular:   Pulses:       Dorsalis pedis pulses are 1+ on the right side, and 1+ on the left side.        Posterior tibial pulses are 1+ on the right side, and 1+ on the left side.   < 3 sec capillary refill time to toes 1-5 bilateral. Toes and feet are warm to touch proximally with normal distal cooling b/l. There is no hair growth on the feet and toes b/l. There is minimal edema b/l. There are varicosities present b/l.      Musculoskeletal:   Equinus noted b/l ankles with < 10 deg DF noted. MMT 5/5 in DF/PF/Inv/Ev resistance with no reproduction of pain in any direction. Passive range of motion of ankle and pedal joints is painless b/l.     Neurological: She is alert and oriented to person, place, and time. She has normal strength. She displays no atrophy and no tremor. No sensory deficit. She exhibits normal muscle tone.   Negative tinel sign  bilateral.   Skin: Skin is warm, dry and intact. No abrasion, no bruising, no burn, no ecchymosis, no laceration, no lesion, no petechiae and no rash noted. She is not diaphoretic. No cyanosis or erythema. No pallor. Nails show no clubbing.   Skin temperature, texture and turgor within normal limits and age appropriate.     Nails 1-5 bilateral are thick 3-4 mm, long 3-6 mm, and discolored with subungual debris    Right medial hallux and lateral fifth toe focal hyperkeratotic lesions are present.     Right dorsal second toe ulcer with fat layer exposed, does not probe to bone. Granular base, there is erythema but no drainage.          Psychiatric: She has a normal mood and affect. Her behavior is normal.           Assessment:       Encounter Diagnosis   Name Primary?    Skin ulcer of second toe of right foot with fat layer exposed Yes         Plan:       Nikki was seen today for foot problem and other misc.    Diagnoses and all orders for this visit:    Skin ulcer of second toe of right foot with fat layer exposed  -     Aerobic culture  -     Culture, Anaerobic  -     X-Ray Toe 2 or More Views Right; Future    Other orders  -     bacitracin-neomycin-polymyxin b-hydrocortisone 1 % ointment; Apply topically 2 (two) times daily.      I counseled the patient on her conditions, their implications and medical management.    Ulcer was inspected there is no need for debridement. Cultures taken and if positive will start on antibiotics.    Use triple antibiotic and band aid daily to the area, open toed shoes only no closed shoes    Return in 1 week wound care.    Be Hidalgo DPM

## 2019-09-09 ENCOUNTER — TELEPHONE (OUTPATIENT)
Dept: FAMILY MEDICINE | Facility: CLINIC | Age: 84
End: 2019-09-09

## 2019-09-09 NOTE — TELEPHONE ENCOUNTER
----- Message from Anais Mantilla sent at 9/9/2019 10:10 AM CDT -----  Contact: Prabhakar Theodore (Spouse)  Type: Needs Medical Advice    Who Called:  Prabhakar Jaimes (Spouse)  Best Call Back Number:   Additional Information: Caller advised that patient usually sees Dr Castro every 3 months and is not sure when she needs to come back in. Last appointment was 6/28/19. Caller did not want to schedule on the line, advised he  just wanted to speak with the nurse.

## 2019-09-10 ENCOUNTER — OFFICE VISIT (OUTPATIENT)
Dept: PODIATRY | Facility: CLINIC | Age: 84
End: 2019-09-10
Payer: MEDICARE

## 2019-09-10 VITALS — WEIGHT: 95 LBS | BODY MASS INDEX: 16.83 KG/M2 | HEIGHT: 63 IN

## 2019-09-10 DIAGNOSIS — L97.512 ULCER OF RIGHT SECOND TOE WITH FAT LAYER EXPOSED: Primary | ICD-10-CM

## 2019-09-10 DIAGNOSIS — I73.9 PERIPHERAL VASCULAR DISEASE: ICD-10-CM

## 2019-09-10 PROCEDURE — 99212 OFFICE O/P EST SF 10 MIN: CPT | Mod: PBBFAC,PN | Performed by: PODIATRIST

## 2019-09-10 PROCEDURE — 99999 PR PBB SHADOW E&M-EST. PATIENT-LVL II: ICD-10-PCS | Mod: PBBFAC,,, | Performed by: PODIATRIST

## 2019-09-10 PROCEDURE — 11042 WOUND DEBRIDEMENT: ICD-10-PCS | Mod: T6,S$PBB,, | Performed by: PODIATRIST

## 2019-09-10 PROCEDURE — 11042 DBRDMT SUBQ TIS 1ST 20SQCM/<: CPT | Mod: T6,PBBFAC,PN | Performed by: PODIATRIST

## 2019-09-10 PROCEDURE — 99499 NO LOS: ICD-10-PCS | Mod: S$PBB,,, | Performed by: PODIATRIST

## 2019-09-10 PROCEDURE — 99499 UNLISTED E&M SERVICE: CPT | Mod: S$PBB,,, | Performed by: PODIATRIST

## 2019-09-10 PROCEDURE — 99999 PR PBB SHADOW E&M-EST. PATIENT-LVL II: CPT | Mod: PBBFAC,,, | Performed by: PODIATRIST

## 2019-09-16 NOTE — PROGRESS NOTES
Subjective:      Patient ID: Nikki Jaimes is a 87 y.o. female.    Chief Complaint: Wound Care (1 week wound care right foot, PCP--06/28/2019)    Nikki is a 87 y.o. female who presents to the clinic complaining of a new sore on the right second toe dorsal aspect. There has been pain to the toe and she noticed some drainage from the area. There is also redness to the toe. No prior treatments.    9/3/19: Patient returns for 1 week right second toe wound care. No other pedal complaints. Wearing darco shoe and treating with triple antibiotic and band aid daily. No new pedal complaints    Review of Systems   Constitution: Negative for chills and fever.   Cardiovascular: Negative for claudication and leg swelling.   Respiratory: Negative for shortness of breath.    Skin: Positive for color change and nail changes. Negative for itching and rash.   Musculoskeletal: Negative for muscle cramps, muscle weakness and myalgias.   Gastrointestinal: Negative for nausea and vomiting.   Neurological: Positive for weakness. Negative for focal weakness, loss of balance, numbness and paresthesias.           Objective:      Physical Exam   Constitutional: She is oriented to person, place, and time. She appears well-developed and well-nourished. No distress.   Cardiovascular:   Pulses:       Dorsalis pedis pulses are 1+ on the right side, and 1+ on the left side.        Posterior tibial pulses are 1+ on the right side, and 1+ on the left side.   < 3 sec capillary refill time to toes 1-5 bilateral. Toes and feet are warm to touch proximally with normal distal cooling b/l. There is no hair growth on the feet and toes b/l. There is minimal edema b/l. There are varicosities present b/l.      Musculoskeletal:   Equinus noted b/l ankles with < 10 deg DF noted. MMT 5/5 in DF/PF/Inv/Ev resistance with no reproduction of pain in any direction. Passive range of motion of ankle and pedal joints is painless b/l.     Neurological: She is  alert and oriented to person, place, and time. She has normal strength. She displays no atrophy and no tremor. No sensory deficit. She exhibits normal muscle tone.   Negative tinel sign bilateral.   Skin: Skin is warm and dry. Lesion noted. No abrasion, no bruising, no burn, no ecchymosis, no laceration, no petechiae and no rash noted. She is not diaphoretic. No cyanosis or erythema. No pallor. Nails show no clubbing.   Skin temperature, texture and turgor within normal limits and age appropriate.     Nails 1-5 bilateral are thick 3-4 mm, long 3-6 mm, and discolored with subungual debris    Right medial hallux and lateral fifth toe focal hyperkeratotic lesions are present.     Right dorsal second toe ulcer with hard stable eschar covering, no drainage, still has persistent erythema       Psychiatric: She has a normal mood and affect. Her behavior is normal.           Assessment:       Encounter Diagnoses   Name Primary?    Skin ulcer of second toe of right foot with fat layer exposed Yes    Peripheral vascular disease          Plan:       Nikki was seen today for wound care.    Diagnoses and all orders for this visit:    Skin ulcer of second toe of right foot with fat layer exposed    Peripheral vascular disease      I counseled the patient on her conditions, their implications and medical management.    Ulcer was inspected there is no need for debridement. Cultures were negative.    Use triple antibiotic and band aid daily to the area, open toed shoes only no closed shoes    Return in 1 week wound care.    Be Hidalgo DPM

## 2019-09-17 ENCOUNTER — TELEPHONE (OUTPATIENT)
Dept: PODIATRY | Facility: CLINIC | Age: 84
End: 2019-09-17

## 2019-09-17 ENCOUNTER — OFFICE VISIT (OUTPATIENT)
Dept: PODIATRY | Facility: CLINIC | Age: 84
End: 2019-09-17
Payer: MEDICARE

## 2019-09-17 VITALS
HEIGHT: 63 IN | SYSTOLIC BLOOD PRESSURE: 144 MMHG | DIASTOLIC BLOOD PRESSURE: 66 MMHG | HEART RATE: 91 BPM | WEIGHT: 95 LBS | BODY MASS INDEX: 16.83 KG/M2

## 2019-09-17 DIAGNOSIS — L97.512 ULCER OF RIGHT SECOND TOE WITH FAT LAYER EXPOSED: ICD-10-CM

## 2019-09-17 DIAGNOSIS — I73.9 PERIPHERAL VASCULAR DISEASE: Primary | ICD-10-CM

## 2019-09-17 PROCEDURE — 99213 OFFICE O/P EST LOW 20 MIN: CPT | Mod: PBBFAC,PN | Performed by: PODIATRIST

## 2019-09-17 PROCEDURE — 99213 OFFICE O/P EST LOW 20 MIN: CPT | Mod: S$PBB,,, | Performed by: PODIATRIST

## 2019-09-17 PROCEDURE — 99999 PR PBB SHADOW E&M-EST. PATIENT-LVL III: CPT | Mod: PBBFAC,,, | Performed by: PODIATRIST

## 2019-09-17 PROCEDURE — 99999 PR PBB SHADOW E&M-EST. PATIENT-LVL III: ICD-10-PCS | Mod: PBBFAC,,, | Performed by: PODIATRIST

## 2019-09-17 PROCEDURE — 99213 PR OFFICE/OUTPT VISIT, EST, LEVL III, 20-29 MIN: ICD-10-PCS | Mod: S$PBB,,, | Performed by: PODIATRIST

## 2019-09-17 NOTE — TELEPHONE ENCOUNTER
Patient's  stated that the patient's toe is hurting about 7 out of 10.  Wound.  Asking if she can be seen today.  Please advise.

## 2019-09-17 NOTE — TELEPHONE ENCOUNTER
----- Message from Elvia Walton sent at 9/17/2019 10:18 AM CDT -----  Type:  Same Day Appointment Request    Caller is requesting a same day appointment.  Caller declined first available appointment listed below.      Name of Caller:   / Prabhakar                                                                                        When is the first available appointment?  09/18 (cannot do the times) request today late afternoon   Symptoms:   Pain in toe   Best Call Back Number:  [phone]                                                                                  Additional Information:

## 2019-09-17 NOTE — TELEPHONE ENCOUNTER
Appointment made for today.      Be Hidalgo, MERCEDEZ Noble LPN   Caller: Unspecified (Today, 10:48 AM)             Yes I can see her today have her in with a follow up appointment double booked

## 2019-09-23 NOTE — PROGRESS NOTES
Subjective:      Patient ID: Nikki Jaimes is a 87 y.o. female.    Chief Complaint: Wound Care (1 week recheck wound right, PCP--06/28/2019)    Nikki is a 87 y.o. female who presents to the clinic complaining of a new sore on the right second toe dorsal aspect. There has been pain to the toe and she noticed some drainage from the area. There is also redness to the toe. No prior treatments.    9/3/19: Patient returns for 1 week right second toe wound care. No other pedal complaints. Wearing darco shoe and treating with triple antibiotic and band aid daily. No new pedal complaints    9/10/19: Patient returns for wound care right second toe, covering with band aid and in darco shoe daily.    Review of Systems   Constitution: Negative for chills and fever.   Cardiovascular: Negative for claudication and leg swelling.   Respiratory: Negative for shortness of breath.    Skin: Positive for color change and nail changes. Negative for itching and rash.   Musculoskeletal: Negative for muscle cramps, muscle weakness and myalgias.   Gastrointestinal: Negative for nausea and vomiting.   Neurological: Positive for weakness. Negative for focal weakness, loss of balance, numbness and paresthesias.           Objective:      Physical Exam   Constitutional: She is oriented to person, place, and time. She appears well-developed and well-nourished. No distress.   Cardiovascular:   Pulses:       Dorsalis pedis pulses are 1+ on the right side, and 1+ on the left side.        Posterior tibial pulses are 1+ on the right side, and 1+ on the left side.   < 3 sec capillary refill time to toes 1-5 bilateral. Toes and feet are warm to touch proximally with normal distal cooling b/l. There is no hair growth on the feet and toes b/l. There is minimal edema b/l. There are varicosities present b/l.      Musculoskeletal:   Equinus noted b/l ankles with < 10 deg DF noted. MMT 5/5 in DF/PF/Inv/Ev resistance with no reproduction of pain in  any direction. Passive range of motion of ankle and pedal joints is painless b/l.     Neurological: She is alert and oriented to person, place, and time. She has normal strength. She displays no atrophy and no tremor. No sensory deficit. She exhibits normal muscle tone.   Negative tinel sign bilateral.   Skin: Skin is warm and dry. Lesion noted. No abrasion, no bruising, no burn, no ecchymosis, no laceration, no petechiae and no rash noted. She is not diaphoretic. No cyanosis or erythema. No pallor. Nails show no clubbing.   Skin temperature, texture and turgor within normal limits and age appropriate.     Nails 1-5 bilateral are thick 3-4 mm, long 3-6 mm, and discolored with subungual debris    Right medial hallux and lateral fifth toe focal hyperkeratotic lesions are present.     Right dorsal second toe ulcer with hard stable eschar covering, after debridement there is 0.2x0.2x0.1 cm ulcer with granular base. no drainage, still has persistent erythema       Psychiatric: She has a normal mood and affect. Her behavior is normal.           Assessment:       Encounter Diagnoses   Name Primary?    Ulcer of right second toe with fat layer exposed Yes    Peripheral vascular disease          Plan:       Nikki was seen today for wound care.    Diagnoses and all orders for this visit:    Ulcer of right second toe with fat layer exposed  -     Wound Debridement    Peripheral vascular disease      I counseled the patient on her conditions, their implications and medical management.    Ulcer was inspected and debdrided. Cultures were negative.    Use triple antibiotic and band aid daily to the area, open toed shoes only no closed shoes    Return in 1 week wound care.    Be Hidalgo DPM

## 2019-09-23 NOTE — PROCEDURES
"Wound Debridement  Date/Time: 9/10/2019 2:00 PM  Performed by: eB Hidalgo DPM  Authorized by: Be Hidalgo DPM     Time out: Immediately prior to procedure a "time out" was called to verify the correct patient, procedure, equipment, support staff and site/side marked as required.    Consent Done?:  Yes (Verbal)  Local anesthesia used?: No      Wound Details:    Location:  Right foot    Location:  Right 2nd Toe    Type of Debridement:  Excisional       Length (cm):  0.2       Area (sq cm):  0.04       Width (cm):  0.2       Percent Debrided (%):  100       Depth (cm):  0.1       Total Area Debrided (sq cm):  0.04    Depth of debridement:  Subcutaneous tissue    Instruments:  Curette    Bleeding:  Minimal  Hemostasis Achieved: Yes    Method Used:  Pressure  Patient tolerance:  Patient tolerated the procedure well with no immediate complications      "

## 2019-09-24 ENCOUNTER — OFFICE VISIT (OUTPATIENT)
Dept: PODIATRY | Facility: CLINIC | Age: 84
End: 2019-09-24
Payer: MEDICARE

## 2019-09-24 ENCOUNTER — HOSPITAL ENCOUNTER (OUTPATIENT)
Dept: RADIOLOGY | Facility: HOSPITAL | Age: 84
Discharge: HOME | End: 2019-09-24
Attending: PODIATRIST
Payer: MEDICARE

## 2019-09-24 VITALS
HEART RATE: 80 BPM | WEIGHT: 95 LBS | HEIGHT: 63 IN | SYSTOLIC BLOOD PRESSURE: 124 MMHG | BODY MASS INDEX: 16.83 KG/M2 | DIASTOLIC BLOOD PRESSURE: 73 MMHG

## 2019-09-24 DIAGNOSIS — I73.9 PERIPHERAL VASCULAR DISEASE: ICD-10-CM

## 2019-09-24 DIAGNOSIS — L97.512 ULCER OF RIGHT SECOND TOE WITH FAT LAYER EXPOSED: ICD-10-CM

## 2019-09-24 DIAGNOSIS — I73.9 PERIPHERAL VASCULAR DISEASE: Primary | ICD-10-CM

## 2019-09-24 PROCEDURE — 99999 PR PBB SHADOW E&M-EST. PATIENT-LVL III: ICD-10-PCS | Mod: PBBFAC,,, | Performed by: PODIATRIST

## 2019-09-24 PROCEDURE — 93922 US ARTERIAL LOWER EXTREMITY BILAT WITH ABI (XPD): ICD-10-PCS | Mod: 26,,, | Performed by: RADIOLOGY

## 2019-09-24 PROCEDURE — 99212 OFFICE O/P EST SF 10 MIN: CPT | Mod: S$PBB,,, | Performed by: PODIATRIST

## 2019-09-24 PROCEDURE — 93922 UPR/L XTREMITY ART 2 LEVELS: CPT | Mod: TC,PO

## 2019-09-24 PROCEDURE — 99212 PR OFFICE/OUTPT VISIT, EST, LEVL II, 10-19 MIN: ICD-10-PCS | Mod: S$PBB,,, | Performed by: PODIATRIST

## 2019-09-24 PROCEDURE — 93925 LOWER EXTREMITY STUDY: CPT | Mod: 26,,, | Performed by: RADIOLOGY

## 2019-09-24 PROCEDURE — 99213 OFFICE O/P EST LOW 20 MIN: CPT | Mod: PBBFAC,25,PN | Performed by: PODIATRIST

## 2019-09-24 PROCEDURE — 93922 UPR/L XTREMITY ART 2 LEVELS: CPT | Mod: 26,,, | Performed by: RADIOLOGY

## 2019-09-24 PROCEDURE — 93925 US ARTERIAL LOWER EXTREMITY BILAT WITH ABI (XPD): ICD-10-PCS | Mod: 26,,, | Performed by: RADIOLOGY

## 2019-09-24 PROCEDURE — 99999 PR PBB SHADOW E&M-EST. PATIENT-LVL III: CPT | Mod: PBBFAC,,, | Performed by: PODIATRIST

## 2019-09-25 NOTE — PROGRESS NOTES
Subjective:      Patient ID: Nikki Jaimes is a 87 y.o. female.    Chief Complaint: Follow-up (wound care - 1 wk ck - right) and Other Misc (PCP:  Dr Castro 6/28/19)    Nikki is a 87 y.o. female who presents to the clinic complaining of a new sore on the right second toe dorsal aspect. There has been pain to the toe and she noticed some drainage from the area. There is also redness to the toe. No prior treatments.    9/3/19: Patient returns for 1 week right second toe wound care. No other pedal complaints. Wearing darco shoe and treating with triple antibiotic and band aid daily. No new pedal complaints    9/10/19: Patient returns for wound care right second toe, covering with band aid and in darco shoe daily.    9/24/19: Patient returns for continued wound care right second toe, in football dressing which stayed c/d/i. No new pedal complaints.     Review of Systems   Constitution: Negative for chills and fever.   Cardiovascular: Negative for claudication and leg swelling.   Respiratory: Negative for shortness of breath.    Skin: Positive for color change and nail changes. Negative for itching and rash.   Musculoskeletal: Negative for muscle cramps, muscle weakness and myalgias.   Gastrointestinal: Negative for nausea and vomiting.   Neurological: Positive for weakness. Negative for focal weakness, loss of balance, numbness and paresthesias.           Objective:      Physical Exam   Constitutional: She is oriented to person, place, and time. She appears well-developed and well-nourished. No distress.   Cardiovascular:   Pulses:       Dorsalis pedis pulses are 1+ on the right side, and 1+ on the left side.        Posterior tibial pulses are 1+ on the right side, and 1+ on the left side.   < 3 sec capillary refill time to toes 1-5 bilateral. Toes and feet are warm to touch proximally with normal distal cooling b/l. There is no hair growth on the feet and toes b/l. There is minimal edema b/l. There are varicosities  present b/l.      Musculoskeletal:   Equinus noted b/l ankles with < 10 deg DF noted. MMT 5/5 in DF/PF/Inv/Ev resistance with no reproduction of pain in any direction. Passive range of motion of ankle and pedal joints is painless b/l.     Neurological: She is alert and oriented to person, place, and time. She has normal strength. She displays no atrophy and no tremor. No sensory deficit. She exhibits normal muscle tone.   Negative tinel sign bilateral.   Skin: Skin is warm and dry. Lesion noted. No abrasion, no bruising, no burn, no ecchymosis, no laceration, no petechiae and no rash noted. She is not diaphoretic. No cyanosis or erythema. No pallor. Nails show no clubbing.   Skin temperature, texture and turgor within normal limits and age appropriate.     Nails 1-5 bilateral are thick 3-4 mm, long 3-6 mm, and discolored with subungual debris    Right medial hallux and lateral fifth toe focal hyperkeratotic lesions are present.     Right dorsal second toe ulcer with hard stable eschar covering, after removal of eschar there is 0.2x0.2x0.1 cm ulcer with granular base. no drainage, still has persistent erythema       Psychiatric: She has a normal mood and affect. Her behavior is normal.           Assessment:       Encounter Diagnoses   Name Primary?    Peripheral vascular disease Yes    Ulcer of right second toe with fat layer exposed          Plan:       Nikki was seen today for follow-up and other misc.    Diagnoses and all orders for this visit:    Peripheral vascular disease  -     Ambulatory consult to Vascular Surgery    Ulcer of right second toe with fat layer exposed  -     Ambulatory consult to Vascular Surgery      I counseled the patient on her conditions, their implications and medical management.    Ulcer was inspected and is not improving as expected, US is showing significant stenosis, will get a vascular surgery consult as to whether intervention is safe and or warranted.    Dressed with iodosorb  and a football dressing    Return in 1 week wound care.    Be Hidalgo DPM

## 2019-09-30 NOTE — PROGRESS NOTES
Subjective:      Patient ID: Nikki Jaimes is a 87 y.o. female.    Chief Complaint: Wound Care (recheck wound, PCP-)    Nikki is a 87 y.o. female who presents to the clinic complaining of a new sore on the right second toe dorsal aspect. There has been pain to the toe and she noticed some drainage from the area. There is also redness to the toe. No prior treatments.    9/3/19: Patient returns for 1 week right second toe wound care. No other pedal complaints. Wearing darco shoe and treating with triple antibiotic and band aid daily. No new pedal complaints    9/10/19: Patient returns for wound care right second toe, covering with band aid and in darco shoe daily.    9/17/19: Patient returns for  Follow up right second toe ulcer, she reports pain to the area, wondering if it is getting worse    Review of Systems   Constitution: Negative for chills and fever.   Cardiovascular: Negative for claudication and leg swelling.   Respiratory: Negative for shortness of breath.    Skin: Positive for color change and nail changes. Negative for itching and rash.   Musculoskeletal: Negative for muscle cramps, muscle weakness and myalgias.   Gastrointestinal: Negative for nausea and vomiting.   Neurological: Positive for weakness. Negative for focal weakness, loss of balance, numbness and paresthesias.           Objective:      Physical Exam   Constitutional: She is oriented to person, place, and time. She appears well-developed and well-nourished. No distress.   Cardiovascular:   Pulses:       Dorsalis pedis pulses are 1+ on the right side, and 1+ on the left side.        Posterior tibial pulses are 1+ on the right side, and 1+ on the left side.   < 3 sec capillary refill time to toes 1-5 bilateral. Toes and feet are warm to touch proximally with normal distal cooling b/l. There is no hair growth on the feet and toes b/l. There is minimal edema b/l. There are varicosities present b/l.      Musculoskeletal:   Equinus  noted b/l ankles with < 10 deg DF noted. MMT 5/5 in DF/PF/Inv/Ev resistance with no reproduction of pain in any direction. Passive range of motion of ankle and pedal joints is painless b/l.     Neurological: She is alert and oriented to person, place, and time. She has normal strength. She displays no atrophy and no tremor. No sensory deficit. She exhibits normal muscle tone.   Negative tinel sign bilateral.   Skin: Skin is warm and dry. Lesion noted. No abrasion, no bruising, no burn, no ecchymosis, no laceration, no petechiae and no rash noted. She is not diaphoretic. No cyanosis or erythema. No pallor. Nails show no clubbing.   Skin temperature, texture and turgor within normal limits and age appropriate.     Nails 1-5 bilateral are thick 3-4 mm, long 3-6 mm, and discolored with subungual debris    Right medial hallux and lateral fifth toe focal hyperkeratotic lesions are present.     Right dorsal second toe ulcer with hard stable eschar covering, after debridement there is 0.2x0.2x0.1 cm ulcer with granular base. no drainage, still has persistent erythema       Psychiatric: She has a normal mood and affect. Her behavior is normal.           Assessment:       Encounter Diagnoses   Name Primary?    Peripheral vascular disease Yes    Ulcer of right second toe with fat layer exposed          Plan:       Nikki was seen today for wound care.    Diagnoses and all orders for this visit:    Peripheral vascular disease  -     US Lower Extrem Arteries Bilat with ADONIS (xpd); Future    Ulcer of right second toe with fat layer exposed      I counseled the patient on her conditions, their implications and medical management.    Ulcer was inspected. Cultures were negative.    Covered area in iodosorb and footall, offloading in darco    Will order art US as I am concerned that it is not healing secondary to her blood flow, there is also increased pain consistent with decreased blood flow.    Return in 1 week wound  care.    Be Hidalgo DPM

## 2019-10-01 ENCOUNTER — OFFICE VISIT (OUTPATIENT)
Dept: PODIATRY | Facility: CLINIC | Age: 84
End: 2019-10-01
Payer: MEDICARE

## 2019-10-01 VITALS — HEIGHT: 63 IN | BODY MASS INDEX: 16.83 KG/M2 | WEIGHT: 95 LBS

## 2019-10-01 DIAGNOSIS — L97.512 ULCER OF RIGHT SECOND TOE WITH FAT LAYER EXPOSED: ICD-10-CM

## 2019-10-01 DIAGNOSIS — I73.9 PERIPHERAL VASCULAR DISEASE: Primary | ICD-10-CM

## 2019-10-01 PROCEDURE — 99499 UNLISTED E&M SERVICE: CPT | Mod: S$PBB,,, | Performed by: PODIATRIST

## 2019-10-01 PROCEDURE — 99499 NO LOS: ICD-10-PCS | Mod: S$PBB,,, | Performed by: PODIATRIST

## 2019-10-01 PROCEDURE — 11042 WOUND DEBRIDEMENT: ICD-10-PCS | Mod: T6,S$PBB,, | Performed by: PODIATRIST

## 2019-10-01 PROCEDURE — 99999 PR PBB SHADOW E&M-EST. PATIENT-LVL III: CPT | Mod: PBBFAC,,, | Performed by: PODIATRIST

## 2019-10-01 PROCEDURE — 11042 DBRDMT SUBQ TIS 1ST 20SQCM/<: CPT | Mod: T6,PBBFAC,PN | Performed by: PODIATRIST

## 2019-10-01 PROCEDURE — 99213 OFFICE O/P EST LOW 20 MIN: CPT | Mod: PBBFAC,PN,25 | Performed by: PODIATRIST

## 2019-10-01 PROCEDURE — 99999 PR PBB SHADOW E&M-EST. PATIENT-LVL III: ICD-10-PCS | Mod: PBBFAC,,, | Performed by: PODIATRIST

## 2019-10-01 NOTE — PROGRESS NOTES
Subjective:      Patient ID: Nikki Jaimes is a 87 y.o. female.    Chief Complaint: Wound Care (1 week recheck wound, PCP--06/28/2019)    Nikki is a 87 y.o. female who presents to the clinic complaining of a new sore on the right second toe dorsal aspect. There has been pain to the toe and she noticed some drainage from the area. There is also redness to the toe. No prior treatments.    9/3/19: Patient returns for 1 week right second toe wound care. No other pedal complaints. Wearing darco shoe and treating with triple antibiotic and band aid daily. No new pedal complaints    9/10/19: Patient returns for wound care right second toe, covering with band aid and in darco shoe daily.    9/24/19: Patient returns for continued wound care right second toe, in football dressing which stayed c/d/i. No new pedal complaints.     10/1/19: Patient returns for follow up right second toe ulcer in football and darco. Art US shows significant stenosis. Getting an appointment with vascular    Review of Systems   Constitution: Negative for chills and fever.   Cardiovascular: Negative for claudication and leg swelling.   Respiratory: Negative for shortness of breath.    Skin: Positive for color change and nail changes. Negative for itching and rash.   Musculoskeletal: Negative for muscle cramps, muscle weakness and myalgias.   Gastrointestinal: Negative for nausea and vomiting.   Neurological: Positive for weakness. Negative for focal weakness, loss of balance, numbness and paresthesias.           Objective:      Physical Exam   Constitutional: She is oriented to person, place, and time. She appears well-developed and well-nourished. No distress.   Cardiovascular:   Pulses:       Dorsalis pedis pulses are 1+ on the right side, and 1+ on the left side.        Posterior tibial pulses are 1+ on the right side, and 1+ on the left side.   < 3 sec capillary refill time to toes 1-5 bilateral. Toes and feet are warm to touch  proximally with normal distal cooling b/l. There is no hair growth on the feet and toes b/l. There is minimal edema b/l. There are varicosities present b/l.      Musculoskeletal:   Equinus noted b/l ankles with < 10 deg DF noted. MMT 5/5 in DF/PF/Inv/Ev resistance with no reproduction of pain in any direction. Passive range of motion of ankle and pedal joints is painless b/l.     Neurological: She is alert and oriented to person, place, and time. She has normal strength. She displays no atrophy and no tremor. No sensory deficit. She exhibits normal muscle tone.   Negative tinel sign bilateral.   Skin: Skin is warm and dry. Lesion noted. No abrasion, no bruising, no burn, no ecchymosis, no laceration, no petechiae and no rash noted. She is not diaphoretic. No cyanosis or erythema. No pallor. Nails show no clubbing.   Skin temperature, texture and turgor within normal limits and age appropriate.     Nails 1-5 bilateral are thick 3-4 mm, long 3-6 mm, and discolored with subungual debris    Right medial hallux and lateral fifth toe focal hyperkeratotic lesions are present.     Right dorsal second toe ulcer with hard stable eschar covering, after removal of eschar there is 0.3x0.2x0.1 cm ulcer with granular base. no drainage, resolving erythema       Psychiatric: She has a normal mood and affect. Her behavior is normal.           Assessment:       Encounter Diagnoses   Name Primary?    Peripheral vascular disease Yes    Ulcer of right second toe with fat layer exposed          Plan:       Nikki was seen today for wound care.    Diagnoses and all orders for this visit:    Peripheral vascular disease    Ulcer of right second toe with fat layer exposed  -     Wound Debridement      I counseled the patient on her conditions, their implications and medical management.    Ulcer was inspected and is not improving as expected, US is showing significant stenosis, will get a vascular surgery consult as to whether intervention  is safe and or warranted.    Dressed with iodosorb and a football dressing    Return in 1 week wound care.    Be Hidalgo DPM

## 2019-10-09 ENCOUNTER — OFFICE VISIT (OUTPATIENT)
Dept: PODIATRY | Facility: CLINIC | Age: 84
End: 2019-10-09
Payer: MEDICARE

## 2019-10-09 VITALS — BODY MASS INDEX: 16.83 KG/M2 | HEIGHT: 63 IN | WEIGHT: 95 LBS

## 2019-10-09 DIAGNOSIS — L97.512 ULCER OF RIGHT SECOND TOE WITH FAT LAYER EXPOSED: ICD-10-CM

## 2019-10-09 DIAGNOSIS — I73.9 PERIPHERAL VASCULAR DISEASE: Primary | ICD-10-CM

## 2019-10-09 PROCEDURE — 99213 OFFICE O/P EST LOW 20 MIN: CPT | Mod: PBBFAC,PN,25 | Performed by: PODIATRIST

## 2019-10-09 PROCEDURE — 99499 NO LOS: ICD-10-PCS | Mod: S$PBB,,, | Performed by: PODIATRIST

## 2019-10-09 PROCEDURE — 99999 PR PBB SHADOW E&M-EST. PATIENT-LVL III: CPT | Mod: PBBFAC,,, | Performed by: PODIATRIST

## 2019-10-09 PROCEDURE — 99499 UNLISTED E&M SERVICE: CPT | Mod: S$PBB,,, | Performed by: PODIATRIST

## 2019-10-09 PROCEDURE — 99999 PR PBB SHADOW E&M-EST. PATIENT-LVL III: ICD-10-PCS | Mod: PBBFAC,,, | Performed by: PODIATRIST

## 2019-10-09 PROCEDURE — 11042 WOUND DEBRIDEMENT: ICD-10-PCS | Mod: T6,S$PBB,, | Performed by: PODIATRIST

## 2019-10-09 PROCEDURE — 11042 DBRDMT SUBQ TIS 1ST 20SQCM/<: CPT | Mod: T6,PBBFAC,PN | Performed by: PODIATRIST

## 2019-10-09 NOTE — PROGRESS NOTES
Subjective:      Patient ID: Nikki Jaimes is a 87 y.o. female.    Chief Complaint: Follow-up (1 wk wound care) and Other Misc (PCP:  Dr Castro  6/28/19)    Nikki is a 87 y.o. female who presents to the clinic complaining of a new sore on the right second toe dorsal aspect. There has been pain to the toe and she noticed some drainage from the area. There is also redness to the toe. No prior treatments.    9/3/19: Patient returns for 1 week right second toe wound care. No other pedal complaints. Wearing darco shoe and treating with triple antibiotic and band aid daily. No new pedal complaints    9/10/19: Patient returns for wound care right second toe, covering with band aid and in darco shoe daily.    9/24/19: Patient returns for continued wound care right second toe, in football dressing which stayed c/d/i. No new pedal complaints.     10/8/19: Patient returns for follow up right second toe ulcer, football and darco to offload with weekly dressing changes.    Review of Systems   Constitution: Negative for chills and fever.   Cardiovascular: Negative for claudication and leg swelling.   Respiratory: Negative for shortness of breath.    Skin: Positive for color change and nail changes. Negative for itching and rash.   Musculoskeletal: Negative for muscle cramps, muscle weakness and myalgias.   Gastrointestinal: Negative for nausea and vomiting.   Neurological: Positive for weakness. Negative for focal weakness, loss of balance, numbness and paresthesias.           Objective:      Physical Exam   Constitutional: She is oriented to person, place, and time. She appears well-developed and well-nourished. No distress.   Cardiovascular:   Pulses:       Dorsalis pedis pulses are 1+ on the right side, and 1+ on the left side.        Posterior tibial pulses are 1+ on the right side, and 1+ on the left side.   < 3 sec capillary refill time to toes 1-5 bilateral. Toes and feet are warm to touch proximally with normal distal  cooling b/l. There is no hair growth on the feet and toes b/l. There is minimal edema b/l. There are varicosities present b/l.      Musculoskeletal:   Equinus noted b/l ankles with < 10 deg DF noted. MMT 5/5 in DF/PF/Inv/Ev resistance with no reproduction of pain in any direction. Passive range of motion of ankle and pedal joints is painless b/l.     Neurological: She is alert and oriented to person, place, and time. She has normal strength. She displays no atrophy and no tremor. No sensory deficit. She exhibits normal muscle tone.   Negative tinel sign bilateral.   Skin: Skin is warm and dry. Lesion noted. No abrasion, no bruising, no burn, no ecchymosis, no laceration, no petechiae and no rash noted. She is not diaphoretic. No cyanosis or erythema. No pallor. Nails show no clubbing.   Skin temperature, texture and turgor within normal limits and age appropriate.     Nails 1-5 bilateral are thick 3-4 mm, long 3-6 mm, and discolored with subungual debris    Right medial hallux and lateral fifth toe focal hyperkeratotic lesions are present.     Right dorsal second toe ulcer with hard stable eschar covering, after removal of eschar there is 0.3x0.2x0.1 cm ulcer with granular base. no drainage, resolving erythema       Psychiatric: She has a normal mood and affect. Her behavior is normal.           Assessment:       Encounter Diagnoses   Name Primary?    Peripheral vascular disease Yes    Ulcer of right second toe with fat layer exposed          Plan:       Nikki was seen today for follow-up and other misc.    Diagnoses and all orders for this visit:    Peripheral vascular disease    Ulcer of right second toe with fat layer exposed  -     Wound Debridement      I counseled the patient on her conditions, their implications and medical management.    Ulcer was inspected and is not improving as expected, US is showing significant stenosis, will get a vascular surgery consult as to whether intervention is safe and or  warranted.    Dressed with iodosorb and a football dressing    Return in 1 week wound care.    Be Hidalgo DPM

## 2019-10-10 ENCOUNTER — OFFICE VISIT (OUTPATIENT)
Dept: VASCULAR SURGERY | Facility: CLINIC | Age: 84
End: 2019-10-10
Payer: MEDICARE

## 2019-10-10 VITALS
SYSTOLIC BLOOD PRESSURE: 124 MMHG | WEIGHT: 95 LBS | HEIGHT: 63 IN | BODY MASS INDEX: 16.83 KG/M2 | RESPIRATION RATE: 18 BRPM | HEART RATE: 80 BPM | DIASTOLIC BLOOD PRESSURE: 73 MMHG

## 2019-10-10 DIAGNOSIS — I73.9 PAD (PERIPHERAL ARTERY DISEASE): Primary | ICD-10-CM

## 2019-10-10 PROCEDURE — 99203 PR OFFICE/OUTPT VISIT, NEW, LEVL III, 30-44 MIN: ICD-10-PCS | Mod: S$PBB,,, | Performed by: THORACIC SURGERY (CARDIOTHORACIC VASCULAR SURGERY)

## 2019-10-10 PROCEDURE — 99999 PR PBB SHADOW E&M-EST. PATIENT-LVL III: ICD-10-PCS | Mod: PBBFAC,,, | Performed by: THORACIC SURGERY (CARDIOTHORACIC VASCULAR SURGERY)

## 2019-10-10 PROCEDURE — 99213 OFFICE O/P EST LOW 20 MIN: CPT | Mod: PBBFAC,PO | Performed by: THORACIC SURGERY (CARDIOTHORACIC VASCULAR SURGERY)

## 2019-10-10 PROCEDURE — 99999 PR PBB SHADOW E&M-EST. PATIENT-LVL III: CPT | Mod: PBBFAC,,, | Performed by: THORACIC SURGERY (CARDIOTHORACIC VASCULAR SURGERY)

## 2019-10-10 PROCEDURE — 99203 OFFICE O/P NEW LOW 30 MIN: CPT | Mod: S$PBB,,, | Performed by: THORACIC SURGERY (CARDIOTHORACIC VASCULAR SURGERY)

## 2019-10-10 NOTE — PROGRESS NOTES
This patient has an ulcer on her 2nd toe.  There was a concern that she had arterial insufficiency based on the fact that the wound was healing quite slowly and an ultrasound showing occlusive disease of the right superficial femoral artery.  Her past history is otherwise fairly unremarkable.  She never was a smoker.  She does not have diabetes.  She has mild hypertension.  Medicines are noted and are part of the epic record.  Her problem list was reviewed.  She has no other pertinent family social history.  On physical exam she is elderly in no distress.  Vital signs are stable.  Neck is supple.  Chest is clear to auscultation.  Her heart is in irregular rate and rhythm.  Abdomen is benign.  Pulses are diminished peripherally.  Doppler signals are monophasic.  She has an ulcer on the right 2nd toe on its dorsal aspect.  This is a very small circumcised ulcer.  It appears to be clean.  There is no evidence of infection.  There is no drainage.  The ultrasound was reviewed.  At this point I would recommend conservative management.  If the wound does not improve and does not heal then angiography will be recommended.  I explained to the patient and family that if there is worsening or no improvement then we can arrange for an angiogram in near future.

## 2019-10-14 NOTE — PROCEDURES
"Wound Debridement  Date/Time: 10/1/2019 2:45 PM  Performed by: Be Hidalgo DPM  Authorized by: Be Hidalgo DPM     Time out: Immediately prior to procedure a "time out" was called to verify the correct patient, procedure, equipment, support staff and site/side marked as required.    Consent Done?:  Yes (Verbal)  Local anesthesia used?: No      Wound Details:    Location:  Right foot    Location:  Right 2nd Toe    Type of Debridement:  Excisional       Length (cm):  0.3       Area (sq cm):  0.06       Width (cm):  0.2       Percent Debrided (%):  100       Depth (cm):  0.1       Total Area Debrided (sq cm):  0.06    Depth of debridement:  Subcutaneous tissue    Devitalized tissue debrided:  Callus, Necrotic/Eschar and Slough    Instruments:  Curette    Bleeding:  Minimal  Hemostasis Achieved: Yes    Method Used:  Pressure  Patient tolerance:  Patient tolerated the procedure well with no immediate complications      "

## 2019-10-16 ENCOUNTER — OFFICE VISIT (OUTPATIENT)
Dept: PODIATRY | Facility: CLINIC | Age: 84
End: 2019-10-16
Payer: MEDICARE

## 2019-10-16 VITALS
BODY MASS INDEX: 16.83 KG/M2 | HEART RATE: 77 BPM | SYSTOLIC BLOOD PRESSURE: 120 MMHG | WEIGHT: 95 LBS | HEIGHT: 63 IN | DIASTOLIC BLOOD PRESSURE: 76 MMHG

## 2019-10-16 DIAGNOSIS — I73.9 PERIPHERAL VASCULAR DISEASE: Primary | ICD-10-CM

## 2019-10-16 DIAGNOSIS — L97.512 ULCER OF RIGHT SECOND TOE WITH FAT LAYER EXPOSED: ICD-10-CM

## 2019-10-16 PROCEDURE — 11042 WOUND DEBRIDEMENT: ICD-10-PCS | Mod: S$PBB,,, | Performed by: PODIATRIST

## 2019-10-16 PROCEDURE — 99499 UNLISTED E&M SERVICE: CPT | Mod: S$PBB,,, | Performed by: PODIATRIST

## 2019-10-16 PROCEDURE — 99999 PR PBB SHADOW E&M-EST. PATIENT-LVL III: ICD-10-PCS | Mod: PBBFAC,,, | Performed by: PODIATRIST

## 2019-10-16 PROCEDURE — 99499 NO LOS: ICD-10-PCS | Mod: S$PBB,,, | Performed by: PODIATRIST

## 2019-10-16 PROCEDURE — 11042 DBRDMT SUBQ TIS 1ST 20SQCM/<: CPT | Mod: T6,PBBFAC,PN | Performed by: PODIATRIST

## 2019-10-16 PROCEDURE — 99999 PR PBB SHADOW E&M-EST. PATIENT-LVL III: CPT | Mod: PBBFAC,,, | Performed by: PODIATRIST

## 2019-10-16 PROCEDURE — 99213 OFFICE O/P EST LOW 20 MIN: CPT | Mod: PBBFAC,PN,25 | Performed by: PODIATRIST

## 2019-10-16 NOTE — PROCEDURES
"Wound Debridement  Date/Time: 10/16/2019 3:45 PM  Performed by: Be Hidalgo DPM  Authorized by: Be Hidalgo DPM     Time out: Immediately prior to procedure a "time out" was called to verify the correct patient, procedure, equipment, support staff and site/side marked as required.    Consent Done?:  Yes (Verbal)  Local anesthesia used?: No      Wound Details:    Location:  Right foot    Location:  Right 2nd Toe    Type of Debridement:  Excisional       Length (cm):  0.2       Area (sq cm):  0.04       Width (cm):  0.2       Percent Debrided (%):  100       Depth (cm):  0.1       Total Area Debrided (sq cm):  0.04    Depth of debridement:  Subcutaneous tissue    Devitalized tissue debrided:  Necrotic/Eschar    Instruments:  Curette    Bleeding:  Minimal  Hemostasis Achieved: Yes    Method Used:  Pressure  Patient tolerance:  Patient tolerated the procedure well with no immediate complications      "

## 2019-10-16 NOTE — PROGRESS NOTES
Subjective:      Patient ID: Nikki Jaimes is a 87 y.o. female.    Chief Complaint: Wound Care (1 week recheck, PCP--06/28/2019)    Nikki is a 87 y.o. female who presents to the clinic complaining of a new sore on the right second toe dorsal aspect. There has been pain to the toe and she noticed some drainage from the area. There is also redness to the toe. No prior treatments.    9/3/19: Patient returns for 1 week right second toe wound care. No other pedal complaints. Wearing darco shoe and treating with triple antibiotic and band aid daily. No new pedal complaints    9/10/19: Patient returns for wound care right second toe, covering with band aid and in darco shoe daily.    9/24/19: Patient returns for continued wound care right second toe, in football dressing which stayed c/d/i. No new pedal complaints.     10/16/19: Patient returns for continued wound care right second toe in football and darco. She saw Dr. Ruano last week who wants to give the wound time to heal if it is not getting better or gets worse he will consider an angiogram.     Review of Systems   Constitution: Negative for chills and fever.   Cardiovascular: Negative for claudication and leg swelling.   Respiratory: Negative for shortness of breath.    Skin: Positive for color change and nail changes. Negative for itching and rash.   Musculoskeletal: Negative for muscle cramps, muscle weakness and myalgias.   Gastrointestinal: Negative for nausea and vomiting.   Neurological: Positive for weakness. Negative for focal weakness, loss of balance, numbness and paresthesias.           Objective:      Physical Exam   Constitutional: She is oriented to person, place, and time. She appears well-developed and well-nourished. No distress.   Cardiovascular:   Pulses:       Dorsalis pedis pulses are 1+ on the right side, and 1+ on the left side.        Posterior tibial pulses are 1+ on the right side, and 1+ on the left side.   < 3 sec capillary  refill time to toes 1-5 bilateral. Toes and feet are warm to touch proximally with normal distal cooling b/l. There is no hair growth on the feet and toes b/l. There is minimal edema b/l. There are varicosities present b/l.      Musculoskeletal:   Equinus noted b/l ankles with < 10 deg DF noted. MMT 5/5 in DF/PF/Inv/Ev resistance with no reproduction of pain in any direction. Passive range of motion of ankle and pedal joints is painless b/l.     Neurological: She is alert and oriented to person, place, and time. She has normal strength. She displays no atrophy and no tremor. No sensory deficit. She exhibits normal muscle tone.   Negative tinel sign bilateral.   Skin: Skin is warm and dry. Lesion noted. No abrasion, no bruising, no burn, no ecchymosis, no laceration, no petechiae and no rash noted. She is not diaphoretic. No cyanosis or erythema. No pallor. Nails show no clubbing.   Skin temperature, texture and turgor within normal limits and age appropriate.     Nails 1-5 bilateral are thick 3-4 mm, long 3-6 mm, and discolored with subungual debris    Right medial hallux and lateral fifth toe focal hyperkeratotic lesions are present.     Right dorsal second toe ulcer with hard stable eschar covering, after removal of eschar there is 0.2x0.2x0.1 cm ulcer with granular base. no drainage, resolving erythema       Psychiatric: She has a normal mood and affect. Her behavior is normal.           Assessment:       Encounter Diagnoses   Name Primary?    Peripheral vascular disease Yes    Ulcer of right second toe with fat layer exposed          Plan:       Nikki was seen today for wound care.    Diagnoses and all orders for this visit:    Peripheral vascular disease  -     Wound Debridement    Ulcer of right second toe with fat layer exposed  -     Wound Debridement      I counseled the patient on her conditions, their implications and medical management.    Ulcer was inspected and is not improving as expected, will  continue conservative wound care and if not healing in the next couple months will send her back for an angiogram.    Dressed with iodosorb and a football dressing    Return in 1 week wound care.    Be Hidalgo DPM

## 2019-10-21 NOTE — PROCEDURES
"Wound Debridement  Date/Time: 10/9/2019 3:30 PM  Performed by: Be Hidalgo DPM  Authorized by: Be Hidalgo DPM     Time out: Immediately prior to procedure a "time out" was called to verify the correct patient, procedure, equipment, support staff and site/side marked as required.    Consent Done?:  Yes (Verbal)  Local anesthesia used?: No      Wound Details:    Location:  Right foot    Location:  Right 2nd Toe    Type of Debridement:  Excisional       Length (cm):  0.3       Area (sq cm):  0.06       Width (cm):  0.2       Percent Debrided (%):  100       Depth (cm):  0.2       Total Area Debrided (sq cm):  0.06    Depth of debridement:  Subcutaneous tissue    Devitalized tissue debrided:  Biofilm, Necrotic/Eschar and Slough    Instruments:  Curette    Bleeding:  Minimal  Hemostasis Achieved: Yes    Method Used:  Pressure  Patient tolerance:  Patient tolerated the procedure well with no immediate complications      "

## 2019-10-23 ENCOUNTER — OFFICE VISIT (OUTPATIENT)
Dept: PODIATRY | Facility: CLINIC | Age: 84
End: 2019-10-23
Payer: MEDICARE

## 2019-10-23 VITALS
HEART RATE: 78 BPM | DIASTOLIC BLOOD PRESSURE: 74 MMHG | BODY MASS INDEX: 16.83 KG/M2 | WEIGHT: 95 LBS | SYSTOLIC BLOOD PRESSURE: 149 MMHG | HEIGHT: 63 IN

## 2019-10-23 DIAGNOSIS — L97.512 ULCER OF RIGHT SECOND TOE WITH FAT LAYER EXPOSED: ICD-10-CM

## 2019-10-23 DIAGNOSIS — I73.9 PERIPHERAL VASCULAR DISEASE: Primary | ICD-10-CM

## 2019-10-23 PROCEDURE — 99999 PR PBB SHADOW E&M-EST. PATIENT-LVL III: CPT | Mod: PBBFAC,,, | Performed by: PODIATRIST

## 2019-10-23 PROCEDURE — 99499 NO LOS: ICD-10-PCS | Mod: S$PBB,,, | Performed by: PODIATRIST

## 2019-10-23 PROCEDURE — 99499 UNLISTED E&M SERVICE: CPT | Mod: S$PBB,,, | Performed by: PODIATRIST

## 2019-10-23 PROCEDURE — 99213 OFFICE O/P EST LOW 20 MIN: CPT | Mod: PBBFAC,PN,25 | Performed by: PODIATRIST

## 2019-10-23 PROCEDURE — 11042 DBRDMT SUBQ TIS 1ST 20SQCM/<: CPT | Mod: T6,PBBFAC,PN | Performed by: PODIATRIST

## 2019-10-23 PROCEDURE — 11042 WOUND DEBRIDEMENT: ICD-10-PCS | Mod: T6,S$PBB,, | Performed by: PODIATRIST

## 2019-10-23 PROCEDURE — 99999 PR PBB SHADOW E&M-EST. PATIENT-LVL III: ICD-10-PCS | Mod: PBBFAC,,, | Performed by: PODIATRIST

## 2019-10-23 NOTE — PROCEDURES
"Wound Debridement  Date/Time: 10/23/2019 2:15 PM  Performed by: Be Hidalgo DPM  Authorized by: Be Hidalgo DPM     Time out: Immediately prior to procedure a "time out" was called to verify the correct patient, procedure, equipment, support staff and site/side marked as required.    Consent Done?:  Yes (Verbal)  Local anesthesia used?: No      Wound Details:    Location:  Right foot    Location:  Right 2nd Toe    Type of Debridement:  Excisional       Length (cm):  0.1       Area (sq cm):  0.01       Width (cm):  0.1       Percent Debrided (%):  100       Depth (cm):  0.1       Total Area Debrided (sq cm):  0.01    Depth of debridement:  Subcutaneous tissue    Devitalized tissue debrided:  Biofilm, Callus and Slough    Instruments:  Curette    Bleeding:  Minimal  Hemostasis Achieved: Yes    Method Used:  Pressure  Patient tolerance:  Patient tolerated the procedure well with no immediate complications      "

## 2019-10-23 NOTE — PROGRESS NOTES
Subjective:      Patient ID: Nikki Jaimes is a 87 y.o. female.    Chief Complaint: Wound Care (1 week wound care, PCP--06/28/2019)    Nikki is a 87 y.o. female who presents to the clinic complaining of a new sore on the right second toe dorsal aspect. There has been pain to the toe and she noticed some drainage from the area. There is also redness to the toe. No prior treatments.    9/3/19: Patient returns for 1 week right second toe wound care. No other pedal complaints. Wearing darco shoe and treating with triple antibiotic and band aid daily. No new pedal complaints    9/10/19: Patient returns for wound care right second toe, covering with band aid and in darco shoe daily.    9/24/19: Patient returns for continued wound care right second toe, in football dressing which stayed c/d/i. No new pedal complaints.     10/16/19: Patient returns for continued wound care right second toe in football and darco. She saw Dr. Ruano last week who wants to give the wound time to heal if it is not getting better or gets worse he will consider an angiogram.     10/23/19: Patient returns for follow up wound care right second toe wound, in football and darco without pedal changes.    Review of Systems   Constitution: Negative for chills and fever.   Cardiovascular: Negative for claudication and leg swelling.   Respiratory: Negative for shortness of breath.    Skin: Positive for color change and nail changes. Negative for itching and rash.   Musculoskeletal: Negative for muscle cramps, muscle weakness and myalgias.   Gastrointestinal: Negative for nausea and vomiting.   Neurological: Positive for weakness. Negative for focal weakness, loss of balance, numbness and paresthesias.           Objective:      Physical Exam   Constitutional: She is oriented to person, place, and time. She appears well-developed and well-nourished. No distress.   Cardiovascular:   Pulses:       Dorsalis pedis pulses are 1+ on the right side,  and 1+ on the left side.        Posterior tibial pulses are 1+ on the right side, and 1+ on the left side.   < 3 sec capillary refill time to toes 1-5 bilateral. Toes and feet are warm to touch proximally with normal distal cooling b/l. There is no hair growth on the feet and toes b/l. There is minimal edema b/l. There are varicosities present b/l.      Musculoskeletal:   Equinus noted b/l ankles with < 10 deg DF noted. MMT 5/5 in DF/PF/Inv/Ev resistance with no reproduction of pain in any direction. Passive range of motion of ankle and pedal joints is painless b/l.     Neurological: She is alert and oriented to person, place, and time. She has normal strength. She displays no atrophy and no tremor. No sensory deficit. She exhibits normal muscle tone.   Negative tinel sign bilateral.   Skin: Skin is warm and dry. Lesion noted. No abrasion, no bruising, no burn, no ecchymosis, no laceration, no petechiae and no rash noted. She is not diaphoretic. No cyanosis or erythema. No pallor. Nails show no clubbing.   Skin temperature, texture and turgor within normal limits and age appropriate.     Nails 1-5 bilateral are thick 3-4 mm, long 3-6 mm, and discolored with subungual debris    Right medial hallux and lateral fifth toe focal hyperkeratotic lesions are present.     Right dorsal second toe ulcer with hard stable eschar covering, after removal of eschar there is 0.1x0.1x0.1 cm ulcer with granular base. no drainage, resolving erythema       Psychiatric: She has a normal mood and affect. Her behavior is normal.           Assessment:       Encounter Diagnoses   Name Primary?    Peripheral vascular disease Yes    Ulcer of right second toe with fat layer exposed          Plan:       Nikki was seen today for wound care.    Diagnoses and all orders for this visit:    Peripheral vascular disease  -     Wound Debridement    Ulcer of right second toe with fat layer exposed  -     Wound Debridement      I counseled the patient  on her conditions, their implications and medical management.    Ulcer was inspected and is slowly improving at this point.    Ulcer debrided see attached noted    Dressed with iodosorb and a football dressing    Return in 1 week wound care.    Be Hidalgo DPM

## 2019-10-24 ENCOUNTER — LAB VISIT (OUTPATIENT)
Dept: LAB | Facility: HOSPITAL | Age: 84
End: 2019-10-24
Attending: INTERNAL MEDICINE
Payer: MEDICARE

## 2019-10-24 DIAGNOSIS — N20.0 CALCULUS OF KIDNEY: ICD-10-CM

## 2019-10-24 DIAGNOSIS — N28.1 ACQUIRED CYST OF KIDNEY: ICD-10-CM

## 2019-10-24 DIAGNOSIS — N25.81 HYPERPARATHYROIDISM DUE TO RENAL INSUFFICIENCY: ICD-10-CM

## 2019-10-24 DIAGNOSIS — N18.4 CKD (CHRONIC KIDNEY DISEASE) STAGE 4, GFR 15-29 ML/MIN: ICD-10-CM

## 2019-10-24 LAB
ALBUMIN SERPL BCP-MCNC: 2.7 G/DL (ref 3.5–5.2)
ANION GAP SERPL CALC-SCNC: 10 MMOL/L (ref 8–16)
BASOPHILS # BLD AUTO: 0.09 K/UL (ref 0–0.2)
BASOPHILS NFR BLD: 1.1 % (ref 0–1.9)
BUN SERPL-MCNC: 41 MG/DL (ref 8–23)
CALCIUM SERPL-MCNC: 8.7 MG/DL (ref 8.7–10.5)
CHLORIDE SERPL-SCNC: 111 MMOL/L (ref 95–110)
CO2 SERPL-SCNC: 13 MMOL/L (ref 23–29)
CREAT SERPL-MCNC: 3 MG/DL (ref 0.5–1.4)
DIFFERENTIAL METHOD: ABNORMAL
EOSINOPHIL # BLD AUTO: 0.7 K/UL (ref 0–0.5)
EOSINOPHIL NFR BLD: 8.3 % (ref 0–8)
ERYTHROCYTE [DISTWIDTH] IN BLOOD BY AUTOMATED COUNT: 16.4 % (ref 11.5–14.5)
EST. GFR  (AFRICAN AMERICAN): 15.5 ML/MIN/1.73 M^2
EST. GFR  (NON AFRICAN AMERICAN): 13.5 ML/MIN/1.73 M^2
GLUCOSE SERPL-MCNC: 102 MG/DL (ref 70–110)
HCT VFR BLD AUTO: 28.3 % (ref 37–48.5)
HGB BLD-MCNC: 8.2 G/DL (ref 12–16)
IMM GRANULOCYTES # BLD AUTO: 0.04 K/UL (ref 0–0.04)
IMM GRANULOCYTES NFR BLD AUTO: 0.5 % (ref 0–0.5)
LYMPHOCYTES # BLD AUTO: 1.2 K/UL (ref 1–4.8)
LYMPHOCYTES NFR BLD: 14.7 % (ref 18–48)
MCH RBC QN AUTO: 29.1 PG (ref 27–31)
MCHC RBC AUTO-ENTMCNC: 29 G/DL (ref 32–36)
MCV RBC AUTO: 100 FL (ref 82–98)
MONOCYTES # BLD AUTO: 0.7 K/UL (ref 0.3–1)
MONOCYTES NFR BLD: 8.9 % (ref 4–15)
NEUTROPHILS # BLD AUTO: 5.3 K/UL (ref 1.8–7.7)
NEUTROPHILS NFR BLD: 66.5 % (ref 38–73)
NRBC BLD-RTO: 0 /100 WBC
PHOSPHATE SERPL-MCNC: 4.7 MG/DL (ref 2.7–4.5)
PLATELET # BLD AUTO: 342 K/UL (ref 150–350)
PMV BLD AUTO: 10.3 FL (ref 9.2–12.9)
POTASSIUM SERPL-SCNC: 5.7 MMOL/L (ref 3.5–5.1)
PTH-INTACT SERPL-MCNC: 135 PG/ML (ref 9–77)
RBC # BLD AUTO: 2.82 M/UL (ref 4–5.4)
SODIUM SERPL-SCNC: 134 MMOL/L (ref 136–145)
WBC # BLD AUTO: 7.94 K/UL (ref 3.9–12.7)

## 2019-10-24 PROCEDURE — 85025 COMPLETE CBC W/AUTO DIFF WBC: CPT

## 2019-10-24 PROCEDURE — 80069 RENAL FUNCTION PANEL: CPT

## 2019-10-24 PROCEDURE — 36415 COLL VENOUS BLD VENIPUNCTURE: CPT | Mod: PO

## 2019-10-24 PROCEDURE — 83970 ASSAY OF PARATHORMONE: CPT

## 2019-10-30 ENCOUNTER — OFFICE VISIT (OUTPATIENT)
Dept: PODIATRY | Facility: CLINIC | Age: 84
End: 2019-10-30
Payer: MEDICARE

## 2019-10-30 VITALS — BODY MASS INDEX: 16.83 KG/M2 | WEIGHT: 95 LBS | HEIGHT: 63 IN

## 2019-10-30 DIAGNOSIS — L97.512 RIGHT SECOND TOE ULCER, WITH FAT LAYER EXPOSED: Primary | ICD-10-CM

## 2019-10-30 DIAGNOSIS — I73.9 PERIPHERAL VASCULAR DISEASE: ICD-10-CM

## 2019-10-30 PROCEDURE — 99999 PR PBB SHADOW E&M-EST. PATIENT-LVL III: CPT | Mod: PBBFAC,,, | Performed by: PODIATRIST

## 2019-10-30 PROCEDURE — 99999 PR PBB SHADOW E&M-EST. PATIENT-LVL III: ICD-10-PCS | Mod: PBBFAC,,, | Performed by: PODIATRIST

## 2019-10-30 PROCEDURE — 99499 UNLISTED E&M SERVICE: CPT | Mod: S$PBB,,, | Performed by: PODIATRIST

## 2019-10-30 PROCEDURE — 99499 NO LOS: ICD-10-PCS | Mod: S$PBB,,, | Performed by: PODIATRIST

## 2019-10-30 PROCEDURE — 99213 OFFICE O/P EST LOW 20 MIN: CPT | Mod: PBBFAC,PN | Performed by: PODIATRIST

## 2019-10-30 PROCEDURE — 11042 DBRDMT SUBQ TIS 1ST 20SQCM/<: CPT | Mod: T6,PBBFAC,PN | Performed by: PODIATRIST

## 2019-10-30 PROCEDURE — 11042 WOUND DEBRIDEMENT: ICD-10-PCS | Mod: T6,S$PBB,, | Performed by: PODIATRIST

## 2019-10-30 NOTE — PROGRESS NOTES
Subjective:      Patient ID: Nikki Jaimes is a 87 y.o. female.    Chief Complaint: Follow-up (1 wk wound care) and Other Misc (PCP:  Dr Castro 6/28/19)    Nikki is a 87 y.o. female who presents to the clinic complaining of a new sore on the right second toe dorsal aspect. There has been pain to the toe and she noticed some drainage from the area. There is also redness to the toe. No prior treatments.    9/3/19: Patient returns for 1 week right second toe wound care. No other pedal complaints. Wearing darco shoe and treating with triple antibiotic and band aid daily. No new pedal complaints    9/10/19: Patient returns for wound care right second toe, covering with band aid and in darco shoe daily.    9/24/19: Patient returns for continued wound care right second toe, in football dressing which stayed c/d/i. No new pedal complaints.     10/16/19: Patient returns for continued wound care right second toe in football and darco. She saw Dr. Ruano last week who wants to give the wound time to heal if it is not getting better or gets worse he will consider an angiogram.     10/23/19: Patient returns for follow up wound care right second toe wound, in football and darco without pedal changes.    10/30/19: Patient returns for follow up right second toe wound care, in football and darco no acute changes.     Review of Systems   Constitution: Negative for chills and fever.   Cardiovascular: Negative for claudication and leg swelling.   Respiratory: Negative for shortness of breath.    Skin: Positive for color change and nail changes. Negative for itching and rash.   Musculoskeletal: Negative for muscle cramps, muscle weakness and myalgias.   Gastrointestinal: Negative for nausea and vomiting.   Neurological: Positive for weakness. Negative for focal weakness, loss of balance, numbness and paresthesias.           Objective:      Physical Exam   Constitutional: She is oriented to person, place, and time. She appears  well-developed and well-nourished. No distress.   Cardiovascular:   Pulses:       Dorsalis pedis pulses are 1+ on the right side, and 1+ on the left side.        Posterior tibial pulses are 1+ on the right side, and 1+ on the left side.   < 3 sec capillary refill time to toes 1-5 bilateral. Toes and feet are warm to touch proximally with normal distal cooling b/l. There is no hair growth on the feet and toes b/l. There is minimal edema b/l. There are varicosities present b/l.      Musculoskeletal:   Equinus noted b/l ankles with < 10 deg DF noted. MMT 5/5 in DF/PF/Inv/Ev resistance with no reproduction of pain in any direction. Passive range of motion of ankle and pedal joints is painless b/l.     Neurological: She is alert and oriented to person, place, and time. She has normal strength. She displays no atrophy and no tremor. No sensory deficit. She exhibits normal muscle tone.   Negative tinel sign bilateral.   Skin: Skin is warm and dry. Lesion noted. No abrasion, no bruising, no burn, no ecchymosis, no laceration, no petechiae and no rash noted. She is not diaphoretic. No cyanosis or erythema. No pallor. Nails show no clubbing.   Skin temperature, texture and turgor within normal limits and age appropriate.     Nails 1-5 bilateral are thick 3-4 mm, long 3-6 mm, and discolored with subungual debris    Right medial hallux and lateral fifth toe focal hyperkeratotic lesions are present.     Right dorsal second toe ulcer with hard stable eschar covering, after removal of eschar there is 0.1x0.1x0.1 cm ulcer with granular base. no drainage, resolving erythema       Psychiatric: She has a normal mood and affect. Her behavior is normal.           Assessment:       Encounter Diagnoses   Name Primary?    Right second toe ulcer, with fat layer exposed Yes    Peripheral vascular disease          Plan:       Nikki was seen today for follow-up and other misc.    Diagnoses and all orders for this visit:    Right second toe  ulcer, with fat layer exposed  -     Wound Debridement    Peripheral vascular disease      I counseled the patient on her conditions, their implications and medical management.    Ulcer was inspected and is about the same week to week    Ulcer debrided see attached noted    Dressed with iodosorb and a football dressing    Return in 1 week wound care.    Be Hidalgo DPM

## 2019-10-30 NOTE — PROCEDURES
"Wound Debridement  Date/Time: 10/30/2019 4:30 PM  Performed by: Be Hidalgo DPM  Authorized by: Be Hidalgo DPM     Time out: Immediately prior to procedure a "time out" was called to verify the correct patient, procedure, equipment, support staff and site/side marked as required.    Consent Done?:  Yes (Verbal)  Local anesthesia used?: No      Wound Details:    Location:  Right foot    Location:  Right 2nd Toe    Type of Debridement:  Excisional       Length (cm):  0.1       Area (sq cm):  0.01       Width (cm):  0.1       Percent Debrided (%):  100       Depth (cm):  0.1       Total Area Debrided (sq cm):  0.01    Depth of debridement:  Subcutaneous tissue    Instruments:  Curette    Bleeding:  Minimal  Hemostasis Achieved: Yes    Method Used:  Pressure  Patient tolerance:  Patient tolerated the procedure well with no immediate complications      "

## 2019-11-01 ENCOUNTER — OFFICE VISIT (OUTPATIENT)
Dept: NEPHROLOGY | Facility: CLINIC | Age: 84
End: 2019-11-01
Payer: MEDICARE

## 2019-11-01 VITALS — HEIGHT: 63 IN | WEIGHT: 94.13 LBS | BODY MASS INDEX: 16.68 KG/M2

## 2019-11-01 DIAGNOSIS — N25.81 HYPERPARATHYROIDISM DUE TO RENAL INSUFFICIENCY: Primary | ICD-10-CM

## 2019-11-01 DIAGNOSIS — N18.5 CHRONIC RENAL DISEASE, STAGE V: ICD-10-CM

## 2019-11-01 DIAGNOSIS — N28.1 ACQUIRED CYST OF KIDNEY: ICD-10-CM

## 2019-11-01 DIAGNOSIS — N20.0 CALCULUS OF KIDNEY: ICD-10-CM

## 2019-11-01 DIAGNOSIS — E87.5 HYPERKALEMIA: ICD-10-CM

## 2019-11-01 PROCEDURE — 99999 PR PBB SHADOW E&M-EST. PATIENT-LVL III: CPT | Mod: PBBFAC,,, | Performed by: INTERNAL MEDICINE

## 2019-11-01 PROCEDURE — 99213 OFFICE O/P EST LOW 20 MIN: CPT | Mod: PBBFAC,PO | Performed by: INTERNAL MEDICINE

## 2019-11-01 PROCEDURE — 99214 PR OFFICE/OUTPT VISIT, EST, LEVL IV, 30-39 MIN: ICD-10-PCS | Mod: S$PBB,,, | Performed by: INTERNAL MEDICINE

## 2019-11-01 PROCEDURE — 99214 OFFICE O/P EST MOD 30 MIN: CPT | Mod: S$PBB,,, | Performed by: INTERNAL MEDICINE

## 2019-11-01 PROCEDURE — 99999 PR PBB SHADOW E&M-EST. PATIENT-LVL III: ICD-10-PCS | Mod: PBBFAC,,, | Performed by: INTERNAL MEDICINE

## 2019-11-01 RX ORDER — TIMOLOL MALEATE 5 MG/ML
1 SOLUTION/ DROPS OPHTHALMIC 2 TIMES DAILY
COMMUNITY

## 2019-11-01 NOTE — PROGRESS NOTES
"Subjective:       Patient ID: Nikki Jaimes is a 87 y.o. White female who presents for return patient evaluation for chronic renal failure.    She has no uremic or urinary symptoms and is in her usual state of health.  There have been no recent illnesses, hospitalizations or procedures.  She recently had an ulcer on her right foot but was not placed on antibiotics.  When discussing potassium, she states she drinks orange juice daily.      Review of Systems   Constitutional: Positive for fatigue. Negative for appetite change, chills, fever and unexpected weight change.   Eyes: Negative for visual disturbance.   Respiratory: Negative for cough and shortness of breath.    Cardiovascular: Negative for chest pain and leg swelling.   Gastrointestinal: Negative for abdominal pain, diarrhea, nausea and vomiting.   Genitourinary: Negative for difficulty urinating, dysuria and hematuria.   Musculoskeletal: Positive for gait problem. Negative for back pain and myalgias.   Skin: Negative for rash.   Neurological: Negative for weakness and headaches.   Hematological: Bruises/bleeds easily.   Psychiatric/Behavioral: Negative for confusion and sleep disturbance.       The past medical, family and social histories were reviewed for this encounter.     Ht 5' 3" (1.6 m)   Wt 42.7 kg (94 lb 2.2 oz)   BMI 16.68 kg/m²     Objective:      Physical Exam   Constitutional: She appears well-developed and well-nourished. No distress.   HENT:   Head: Normocephalic and atraumatic.   Eyes: Conjunctivae are normal. No scleral icterus.   Neck: Normal range of motion. No JVD present.   Cardiovascular: Normal rate, regular rhythm and normal heart sounds. Exam reveals no gallop and no friction rub.   No murmur heard.  Pulmonary/Chest: Effort normal and breath sounds normal. No respiratory distress. She has no wheezes.   Abdominal: Soft. Bowel sounds are normal. She exhibits no distension. There is no tenderness.   Musculoskeletal: She exhibits no " edema.   Skin: Skin is warm and dry. No rash noted.   Psychiatric: She has a normal mood and affect.   Vitals reviewed.      Assessment:       1. Hyperparathyroidism due to renal insufficiency    2. Calculus of kidney    3. Acquired cyst of kidney    4. Chronic renal disease, stage V    5. Hyperkalemia        Plan:   Return to clinic in 4 months.  Labs for next visit include rp.  RP in 1 month.  Baseline creatinine is 2.3 since 2012.  Conservative management as she does not wish to do dialysis.  PTH is 135 with a calcium of 8.7.   UPC is 0.38.  Blood pressure is controlled on the current regimen.  Continue current medications as prescribed and reviewed.   We discussed her potassium and I did provide a handout regarding this.

## 2019-11-06 ENCOUNTER — OFFICE VISIT (OUTPATIENT)
Dept: PODIATRY | Facility: CLINIC | Age: 84
End: 2019-11-06
Payer: MEDICARE

## 2019-11-06 VITALS
HEIGHT: 63 IN | SYSTOLIC BLOOD PRESSURE: 138 MMHG | HEART RATE: 75 BPM | WEIGHT: 94.13 LBS | DIASTOLIC BLOOD PRESSURE: 65 MMHG | BODY MASS INDEX: 16.68 KG/M2

## 2019-11-06 DIAGNOSIS — I73.9 PERIPHERAL VASCULAR DISEASE: ICD-10-CM

## 2019-11-06 DIAGNOSIS — L97.512 RIGHT SECOND TOE ULCER, WITH FAT LAYER EXPOSED: Primary | ICD-10-CM

## 2019-11-06 PROCEDURE — 11042 DBRDMT SUBQ TIS 1ST 20SQCM/<: CPT | Mod: T6,PBBFAC,PN | Performed by: PODIATRIST

## 2019-11-06 PROCEDURE — 99999 PR PBB SHADOW E&M-EST. PATIENT-LVL III: ICD-10-PCS | Mod: PBBFAC,,, | Performed by: PODIATRIST

## 2019-11-06 PROCEDURE — 99999 PR PBB SHADOW E&M-EST. PATIENT-LVL III: CPT | Mod: PBBFAC,,, | Performed by: PODIATRIST

## 2019-11-06 PROCEDURE — 99213 OFFICE O/P EST LOW 20 MIN: CPT | Mod: PBBFAC,PN,25 | Performed by: PODIATRIST

## 2019-11-06 PROCEDURE — 99499 UNLISTED E&M SERVICE: CPT | Mod: S$PBB,,, | Performed by: PODIATRIST

## 2019-11-06 PROCEDURE — 99499 NO LOS: ICD-10-PCS | Mod: S$PBB,,, | Performed by: PODIATRIST

## 2019-11-06 PROCEDURE — 11042 WOUND DEBRIDEMENT: ICD-10-PCS | Mod: S$PBB,,, | Performed by: PODIATRIST

## 2019-11-06 RX ORDER — MUPIROCIN 20 MG/G
OINTMENT TOPICAL
Refills: 1 | COMMUNITY
Start: 2019-08-27 | End: 2020-01-03

## 2019-11-06 NOTE — PROGRESS NOTES
Subjective:      Patient ID: Nikki Jaimes is a 87 y.o. female.    Chief Complaint: Follow-up (1 wk wound ck - ) and Other Misc (PCP:  Dr Castro  6/28/19)    Nikki is a 87 y.o. female who presents to the clinic complaining of a new sore on the right second toe dorsal aspect. There has been pain to the toe and she noticed some drainage from the area. There is also redness to the toe. No prior treatments.    9/3/19: Patient returns for 1 week right second toe wound care. No other pedal complaints. Wearing darco shoe and treating with triple antibiotic and band aid daily. No new pedal complaints    9/10/19: Patient returns for wound care right second toe, covering with band aid and in darco shoe daily.    9/24/19: Patient returns for continued wound care right second toe, in football dressing which stayed c/d/i. No new pedal complaints.     10/16/19: Patient returns for continued wound care right second toe in football and darco. She saw Dr. Ruano last week who wants to give the wound time to heal if it is not getting better or gets worse he will consider an angiogram.     10/23/19: Patient returns for follow up wound care right second toe wound, in football and darco without pedal changes.    10/30/19: Patient returns for follow up right second toe wound care, in football and darco no acute changes.     11/6/19: Patient returns for follow up right second toe ulcer for wound care, football and darco weekly without acute changes.    Review of Systems   Constitution: Negative for chills and fever.   Cardiovascular: Negative for claudication and leg swelling.   Respiratory: Negative for shortness of breath.    Skin: Positive for color change and nail changes. Negative for itching and rash.   Musculoskeletal: Negative for muscle cramps, muscle weakness and myalgias.   Gastrointestinal: Negative for nausea and vomiting.   Neurological: Positive for weakness. Negative for focal weakness, loss of balance, numbness and  paresthesias.           Objective:      Physical Exam   Constitutional: She is oriented to person, place, and time. She appears well-developed and well-nourished. No distress.   Cardiovascular:   Pulses:       Dorsalis pedis pulses are 1+ on the right side, and 1+ on the left side.        Posterior tibial pulses are 1+ on the right side, and 1+ on the left side.   < 3 sec capillary refill time to toes 1-5 bilateral. Toes and feet are warm to touch proximally with normal distal cooling b/l. There is no hair growth on the feet and toes b/l. There is minimal edema b/l. There are varicosities present b/l.      Musculoskeletal:   Equinus noted b/l ankles with < 10 deg DF noted. MMT 5/5 in DF/PF/Inv/Ev resistance with no reproduction of pain in any direction. Passive range of motion of ankle and pedal joints is painless b/l.     Neurological: She is alert and oriented to person, place, and time. She has normal strength. She displays no atrophy and no tremor. No sensory deficit. She exhibits normal muscle tone.   Negative tinel sign bilateral.   Skin: Skin is warm and dry. Lesion noted. No abrasion, no bruising, no burn, no ecchymosis, no laceration, no petechiae and no rash noted. She is not diaphoretic. No cyanosis or erythema. No pallor. Nails show no clubbing.   Skin temperature, texture and turgor within normal limits and age appropriate.     Nails 1-5 bilateral are thick 3-4 mm, long 3-6 mm, and discolored with subungual debris    Right medial hallux and lateral fifth toe focal hyperkeratotic lesions are present.     Right dorsal second toe ulcer with hard stable eschar covering, after removal of eschar there is 0.1x0.1x0.1 cm ulcer with granular base. no drainage, resolving erythema       Psychiatric: She has a normal mood and affect. Her behavior is normal.           Assessment:       Encounter Diagnoses   Name Primary?    Right second toe ulcer, with fat layer exposed Yes    Peripheral vascular disease           Plan:       Nikki was seen today for follow-up and other misc.    Diagnoses and all orders for this visit:    Right second toe ulcer, with fat layer exposed  -     Wound Debridement    Peripheral vascular disease      I counseled the patient on her conditions, their implications and medical management.    Ulcer was inspected and is stagnant not improving with time as expected    Ulcer debrided see attached noted    Dressed with iodosorb and a football dressing    Return in 1 week wound care. Consider grafting if ulcer does not heal    Be Hidalgo DPM

## 2019-11-06 NOTE — PROCEDURES
"Wound Debridement  Date/Time: 11/6/2019 4:30 PM  Performed by: Be Hidalgo DPM  Authorized by: Be Hidalgo DPM     Time out: Immediately prior to procedure a "time out" was called to verify the correct patient, procedure, equipment, support staff and site/side marked as required.    Consent Done?:  Yes (Verbal)  Local anesthesia used?: No      Wound Details:    Location:  Right foot    Location:  Right 2nd Toe    Type of Debridement:  Excisional       Length (cm):  0.1       Area (sq cm):  0.01       Width (cm):  0.1       Percent Debrided (%):  100       Depth (cm):  0.1       Total Area Debrided (sq cm):  0.01    Depth of debridement:  Subcutaneous tissue    Devitalized tissue debrided:  Callus, Necrotic/Eschar and Slough    Instruments:  Curette    Bleeding:  Minimal  Hemostasis Achieved: Yes    Method Used:  Pressure  Patient tolerance:  Patient tolerated the procedure well with no immediate complications      "

## 2019-11-15 ENCOUNTER — OFFICE VISIT (OUTPATIENT)
Dept: PODIATRY | Facility: CLINIC | Age: 84
End: 2019-11-15
Payer: MEDICARE

## 2019-11-15 VITALS
DIASTOLIC BLOOD PRESSURE: 62 MMHG | HEART RATE: 77 BPM | SYSTOLIC BLOOD PRESSURE: 131 MMHG | HEIGHT: 63 IN | WEIGHT: 94 LBS | BODY MASS INDEX: 16.66 KG/M2

## 2019-11-15 DIAGNOSIS — I73.9 PERIPHERAL VASCULAR DISEASE: ICD-10-CM

## 2019-11-15 DIAGNOSIS — L97.511 RIGHT SECOND TOE ULCER, LIMITED TO BREAKDOWN OF SKIN: Primary | ICD-10-CM

## 2019-11-15 PROCEDURE — 99999 PR PBB SHADOW E&M-EST. PATIENT-LVL III: CPT | Mod: PBBFAC,,, | Performed by: PODIATRIST

## 2019-11-15 PROCEDURE — 99212 PR OFFICE/OUTPT VISIT, EST, LEVL II, 10-19 MIN: ICD-10-PCS | Mod: S$PBB,,, | Performed by: PODIATRIST

## 2019-11-15 PROCEDURE — 99213 OFFICE O/P EST LOW 20 MIN: CPT | Mod: PBBFAC,PN | Performed by: PODIATRIST

## 2019-11-15 PROCEDURE — 99212 OFFICE O/P EST SF 10 MIN: CPT | Mod: S$PBB,,, | Performed by: PODIATRIST

## 2019-11-15 PROCEDURE — 99999 PR PBB SHADOW E&M-EST. PATIENT-LVL III: ICD-10-PCS | Mod: PBBFAC,,, | Performed by: PODIATRIST

## 2019-11-20 ENCOUNTER — OFFICE VISIT (OUTPATIENT)
Dept: PODIATRY | Facility: CLINIC | Age: 84
End: 2019-11-20
Payer: MEDICARE

## 2019-11-20 VITALS
WEIGHT: 94 LBS | RESPIRATION RATE: 20 BRPM | SYSTOLIC BLOOD PRESSURE: 117 MMHG | HEART RATE: 72 BPM | BODY MASS INDEX: 16.66 KG/M2 | HEIGHT: 63 IN | DIASTOLIC BLOOD PRESSURE: 65 MMHG

## 2019-11-20 DIAGNOSIS — Z87.2 HEALED ULCER OF RIGHT FOOT ON EXAMINATION: Primary | ICD-10-CM

## 2019-11-20 DIAGNOSIS — I73.9 PERIPHERAL VASCULAR DISEASE: ICD-10-CM

## 2019-11-20 PROCEDURE — 99212 OFFICE O/P EST SF 10 MIN: CPT | Mod: S$PBB,,, | Performed by: PODIATRIST

## 2019-11-20 PROCEDURE — 99212 PR OFFICE/OUTPT VISIT, EST, LEVL II, 10-19 MIN: ICD-10-PCS | Mod: S$PBB,,, | Performed by: PODIATRIST

## 2019-11-20 PROCEDURE — 99213 OFFICE O/P EST LOW 20 MIN: CPT | Mod: PBBFAC,PN | Performed by: PODIATRIST

## 2019-11-20 PROCEDURE — 1126F PR PAIN SEVERITY QUANTIFIED, NO PAIN PRESENT: ICD-10-PCS | Mod: ,,, | Performed by: PODIATRIST

## 2019-11-20 PROCEDURE — 99999 PR PBB SHADOW E&M-EST. PATIENT-LVL III: ICD-10-PCS | Mod: PBBFAC,,, | Performed by: PODIATRIST

## 2019-11-20 PROCEDURE — 99999 PR PBB SHADOW E&M-EST. PATIENT-LVL III: CPT | Mod: PBBFAC,,, | Performed by: PODIATRIST

## 2019-11-20 PROCEDURE — 1159F MED LIST DOCD IN RCRD: CPT | Mod: ,,, | Performed by: PODIATRIST

## 2019-11-20 PROCEDURE — 1126F AMNT PAIN NOTED NONE PRSNT: CPT | Mod: ,,, | Performed by: PODIATRIST

## 2019-11-20 PROCEDURE — 1159F PR MEDICATION LIST DOCUMENTED IN MEDICAL RECORD: ICD-10-PCS | Mod: ,,, | Performed by: PODIATRIST

## 2019-11-20 NOTE — PROGRESS NOTES
Subjective:      Patient ID: Nikki Jaimes is a 87 y.o. female.    Chief Complaint: Foot Ulcer (right foot)    Nikki is a 87 y.o. female who presents to the clinic complaining of a new sore on the right second toe dorsal aspect. There has been pain to the toe and she noticed some drainage from the area. There is also redness to the toe. No prior treatments.    9/3/19: Patient returns for 1 week right second toe wound care. No other pedal complaints. Wearing darco shoe and treating with triple antibiotic and band aid daily. No new pedal complaints    9/10/19: Patient returns for wound care right second toe, covering with band aid and in darco shoe daily.    9/24/19: Patient returns for continued wound care right second toe, in football dressing which stayed c/d/i. No new pedal complaints.     10/16/19: Patient returns for continued wound care right second toe in football and darco. She saw Dr. Ruano last week who wants to give the wound time to heal if it is not getting better or gets worse he will consider an angiogram.     10/23/19: Patient returns for follow up wound care right second toe wound, in football and darco without pedal changes.    10/30/19: Patient returns for follow up right second toe wound care, in football and darco no acute changes.     11/6/19: Patient returns for follow up right second toe ulcer for wound care, football and darco weekly without acute changes.    11/15/19: Patient returns for follow up right second toe ulcer for continued wound care. She has been in a darco and football the last week without problems.    11/20/19: Patient returns for follow up right second toe ulcer for continue wound care, no new pedal complaints     Review of Systems   Constitution: Negative for chills and fever.   Cardiovascular: Negative for claudication and leg swelling.   Respiratory: Negative for shortness of breath.    Skin: Positive for color change and nail changes. Negative for itching and rash.    Musculoskeletal: Negative for muscle cramps, muscle weakness and myalgias.   Gastrointestinal: Negative for nausea and vomiting.   Neurological: Positive for weakness. Negative for focal weakness, loss of balance, numbness and paresthesias.           Objective:      Physical Exam   Constitutional: She is oriented to person, place, and time. She appears well-developed and well-nourished. No distress.   Cardiovascular:   Pulses:       Dorsalis pedis pulses are 1+ on the right side, and 1+ on the left side.        Posterior tibial pulses are 1+ on the right side, and 1+ on the left side.   < 3 sec capillary refill time to toes 1-5 bilateral. Toes and feet are warm to touch proximally with normal distal cooling b/l. There is no hair growth on the feet and toes b/l. There is minimal edema b/l. There are varicosities present b/l.      Musculoskeletal:   Equinus noted b/l ankles with < 10 deg DF noted. MMT 5/5 in DF/PF/Inv/Ev resistance with no reproduction of pain in any direction. Passive range of motion of ankle and pedal joints is painless b/l.     Neurological: She is alert and oriented to person, place, and time. She has normal strength. She displays no atrophy and no tremor. No sensory deficit. She exhibits normal muscle tone.   Negative tinel sign bilateral.   Skin: Skin is warm and dry. Lesion noted. No abrasion, no bruising, no burn, no ecchymosis, no laceration, no petechiae and no rash noted. She is not diaphoretic. No cyanosis or erythema. No pallor. Nails show no clubbing.   Skin temperature, texture and turgor within normal limits and age appropriate.     Nails 1-5 bilateral are thick 3-4 mm, long 3-6 mm, and discolored with subungual debris    Right medial hallux and lateral fifth toe focal hyperkeratotic lesions are present.     Right dorsal second toe   Measurements: 0 x 0 x 0 cm  Periwound: Intact  Drainage: None.  Pus: None.  Malodor: None.  Base:  100% epithelial tissue. (Fragile epithelium)  Signs  of infection: None.         Psychiatric: She has a normal mood and affect. Her behavior is normal.           Assessment:       Encounter Diagnoses   Name Primary?    Healed ulcer of right foot on examination Yes    Peripheral vascular disease          Plan:       Nikki was seen today for foot ulcer.    Diagnoses and all orders for this visit:    Healed ulcer of right foot on examination    Peripheral vascular disease      I counseled the patient on her conditions, their implications and medical management.    Return in 2 months routine care, sooner if ulcer re opens    Make sure to get new shoes with more room in the toe box      Be Hidalgo DPM

## 2019-12-04 ENCOUNTER — LAB VISIT (OUTPATIENT)
Dept: LAB | Facility: HOSPITAL | Age: 84
End: 2019-12-04
Attending: INTERNAL MEDICINE
Payer: MEDICARE

## 2019-12-04 DIAGNOSIS — E87.5 HYPERKALEMIA: ICD-10-CM

## 2019-12-04 DIAGNOSIS — N18.5 CHRONIC RENAL DISEASE, STAGE V: ICD-10-CM

## 2019-12-04 LAB
ALBUMIN SERPL BCP-MCNC: 2.6 G/DL (ref 3.5–5.2)
ANION GAP SERPL CALC-SCNC: 9 MMOL/L (ref 8–16)
BUN SERPL-MCNC: 39 MG/DL (ref 8–23)
CALCIUM SERPL-MCNC: 8.4 MG/DL (ref 8.7–10.5)
CHLORIDE SERPL-SCNC: 113 MMOL/L (ref 95–110)
CO2 SERPL-SCNC: 15 MMOL/L (ref 23–29)
CREAT SERPL-MCNC: 3.3 MG/DL (ref 0.5–1.4)
EST. GFR  (AFRICAN AMERICAN): 13.7 ML/MIN/1.73 M^2
EST. GFR  (NON AFRICAN AMERICAN): 11.9 ML/MIN/1.73 M^2
GLUCOSE SERPL-MCNC: 98 MG/DL (ref 70–110)
PHOSPHATE SERPL-MCNC: 5.1 MG/DL (ref 2.7–4.5)
POTASSIUM SERPL-SCNC: 4.9 MMOL/L (ref 3.5–5.1)
SODIUM SERPL-SCNC: 137 MMOL/L (ref 136–145)

## 2019-12-04 PROCEDURE — 80069 RENAL FUNCTION PANEL: CPT

## 2019-12-04 PROCEDURE — 36415 COLL VENOUS BLD VENIPUNCTURE: CPT | Mod: PO

## 2019-12-18 ENCOUNTER — OFFICE VISIT (OUTPATIENT)
Dept: PODIATRY | Facility: CLINIC | Age: 84
End: 2019-12-18
Payer: MEDICARE

## 2019-12-18 VITALS — HEIGHT: 63 IN | WEIGHT: 94 LBS | BODY MASS INDEX: 16.66 KG/M2

## 2019-12-18 DIAGNOSIS — Z87.2 HEALED ULCER OF RIGHT FOOT ON EXAMINATION: Primary | ICD-10-CM

## 2019-12-18 DIAGNOSIS — L84 CORN OR CALLUS: ICD-10-CM

## 2019-12-18 DIAGNOSIS — I73.9 PERIPHERAL VASCULAR DISEASE: ICD-10-CM

## 2019-12-18 DIAGNOSIS — B35.1 ONYCHOMYCOSIS DUE TO DERMATOPHYTE: ICD-10-CM

## 2019-12-18 PROCEDURE — 11721 PR DEBRIDEMENT OF NAILS, 6 OR MORE: ICD-10-PCS | Mod: 59,Q9,S$PBB, | Performed by: PODIATRIST

## 2019-12-18 PROCEDURE — 11055 PR TRIM HYPERKERATOTIC SKIN LESION, ONE: ICD-10-PCS | Mod: Q9,S$PBB,, | Performed by: PODIATRIST

## 2019-12-18 PROCEDURE — 99499 UNLISTED E&M SERVICE: CPT | Mod: S$PBB,,, | Performed by: PODIATRIST

## 2019-12-18 PROCEDURE — 99999 PR PBB SHADOW E&M-EST. PATIENT-LVL III: CPT | Mod: PBBFAC,,, | Performed by: PODIATRIST

## 2019-12-18 PROCEDURE — 99213 OFFICE O/P EST LOW 20 MIN: CPT | Mod: PBBFAC,PN,25 | Performed by: PODIATRIST

## 2019-12-18 PROCEDURE — 11721 DEBRIDE NAIL 6 OR MORE: CPT | Mod: 59,Q9,S$PBB, | Performed by: PODIATRIST

## 2019-12-18 PROCEDURE — 99999 PR PBB SHADOW E&M-EST. PATIENT-LVL III: ICD-10-PCS | Mod: PBBFAC,,, | Performed by: PODIATRIST

## 2019-12-18 PROCEDURE — 11721 DEBRIDE NAIL 6 OR MORE: CPT | Mod: Q9,PBBFAC,PN | Performed by: PODIATRIST

## 2019-12-18 PROCEDURE — 11055 PARING/CUTG B9 HYPRKER LES 1: CPT | Mod: Q9,S$PBB,, | Performed by: PODIATRIST

## 2019-12-18 PROCEDURE — 99499 NO LOS: ICD-10-PCS | Mod: S$PBB,,, | Performed by: PODIATRIST

## 2019-12-18 PROCEDURE — 11055 PARING/CUTG B9 HYPRKER LES 1: CPT | Performed by: PODIATRIST

## 2019-12-18 NOTE — PROGRESS NOTES
Subjective:      Patient ID: Nikki Jaimes is a 88 y.o. female.    Chief Complaint: Follow-up (1mth R foot Dr Castro 6/2019 )    Nikki is a 88 y.o. female who presents to the clinic for routine care with PVD, she has a history of ulceration to the dorsal right second toe, has not noticed any drainage or new ulceration.     Review of Systems   Constitution: Negative for chills and fever.   Cardiovascular: Negative for claudication and leg swelling.   Respiratory: Negative for shortness of breath.    Skin: Positive for color change and nail changes. Negative for itching and rash.   Musculoskeletal: Negative for muscle cramps, muscle weakness and myalgias.   Gastrointestinal: Negative for nausea and vomiting.   Neurological: Positive for weakness. Negative for focal weakness, loss of balance, numbness and paresthesias.           Objective:      Physical Exam   Constitutional: She is oriented to person, place, and time. She appears well-developed and well-nourished. No distress.   Cardiovascular:   Pulses:       Dorsalis pedis pulses are 1+ on the right side, and 1+ on the left side.        Posterior tibial pulses are 1+ on the right side, and 1+ on the left side.   < 3 sec capillary refill time to toes 1-5 bilateral. Toes and feet are warm to touch proximally with normal distal cooling b/l. There is no hair growth on the feet and toes b/l. There is minimal edema b/l. There are varicosities present b/l.      Musculoskeletal:   Equinus noted b/l ankles with < 10 deg DF noted. MMT 5/5 in DF/PF/Inv/Ev resistance with no reproduction of pain in any direction. Passive range of motion of ankle and pedal joints is painless b/l.     Neurological: She is alert and oriented to person, place, and time. She has normal strength. She displays no atrophy and no tremor. No sensory deficit. She exhibits normal muscle tone.   Negative tinel sign bilateral.   Skin: Skin is warm and dry. Lesion noted. No abrasion, no bruising, no burn,  no ecchymosis, no laceration, no petechiae and no rash noted. She is not diaphoretic. No cyanosis or erythema. No pallor. Nails show no clubbing.   Skin temperature, texture and turgor within normal limits and age appropriate.     Nails 1-5 bilateral are thick 3-4 mm, long 3-6 mm, and discolored with subungual debris      Right dorsal second toe PIPJ there is a focal hyperkeratotic lesion no noted ulceration underneath or drainage noted.           Psychiatric: She has a normal mood and affect. Her behavior is normal.           Assessment:       Encounter Diagnoses   Name Primary?    Healed ulcer of right foot on examination Yes    Peripheral vascular disease     Corn or callus     Onychomycosis due to dermatophyte          Plan:       Nikki was seen today for follow-up.    Diagnoses and all orders for this visit:    Healed ulcer of right foot on examination    Peripheral vascular disease    Corn or callus    Onychomycosis due to dermatophyte      I counseled the patient on her conditions, their implications and medical management.    Shoe inspection.  Patient instructed on proper foot hygeine. We discussed wearing proper shoe gear, daily foot inspections, never walking without protective shoe gear, never putting sharp instruments to feet    - With patient's permission, nails were aggressively reduced and debrided x 10 to their soft tissue attachment mechanically removing all offending nail and debris. Patient relates relief following the procedure. She will continue to monitor the areas daily, inspect her feet, wear protective shoe gear when ambulatory, moisturizer to maintain skin integrity    With patient's verbal consent the hyperkeratotic lesion right second toe was trimmed to healthy appearing skin using a # 15 scalpel. Patient relates relief of pain following the procedure. Patient tolerated the procedure well without complications. No anesthesia or hemostasis required. No blood loss.    Return 3 months  routine care    EMILY RibeiroM

## 2020-01-03 ENCOUNTER — OFFICE VISIT (OUTPATIENT)
Dept: FAMILY MEDICINE | Facility: CLINIC | Age: 85
End: 2020-01-03
Payer: MEDICARE

## 2020-01-03 VITALS
HEART RATE: 77 BPM | SYSTOLIC BLOOD PRESSURE: 100 MMHG | TEMPERATURE: 98 F | BODY MASS INDEX: 16.13 KG/M2 | WEIGHT: 91.06 LBS | HEIGHT: 63 IN | DIASTOLIC BLOOD PRESSURE: 58 MMHG | OXYGEN SATURATION: 98 %

## 2020-01-03 DIAGNOSIS — E44.0 MODERATE PROTEIN-CALORIE MALNUTRITION: Primary | ICD-10-CM

## 2020-01-03 DIAGNOSIS — N18.5 CHRONIC KIDNEY DISEASE (CKD), STAGE V: ICD-10-CM

## 2020-01-03 PROCEDURE — 1159F MED LIST DOCD IN RCRD: CPT | Mod: ,,, | Performed by: FAMILY MEDICINE

## 2020-01-03 PROCEDURE — 99213 OFFICE O/P EST LOW 20 MIN: CPT | Mod: PBBFAC,PO | Performed by: FAMILY MEDICINE

## 2020-01-03 PROCEDURE — 99999 PR PBB SHADOW E&M-EST. PATIENT-LVL III: ICD-10-PCS | Mod: PBBFAC,,, | Performed by: FAMILY MEDICINE

## 2020-01-03 PROCEDURE — 1126F AMNT PAIN NOTED NONE PRSNT: CPT | Mod: ,,, | Performed by: FAMILY MEDICINE

## 2020-01-03 PROCEDURE — 99213 OFFICE O/P EST LOW 20 MIN: CPT | Mod: S$PBB,,, | Performed by: FAMILY MEDICINE

## 2020-01-03 PROCEDURE — 99213 PR OFFICE/OUTPT VISIT, EST, LEVL III, 20-29 MIN: ICD-10-PCS | Mod: S$PBB,,, | Performed by: FAMILY MEDICINE

## 2020-01-03 PROCEDURE — 1126F PR PAIN SEVERITY QUANTIFIED, NO PAIN PRESENT: ICD-10-PCS | Mod: ,,, | Performed by: FAMILY MEDICINE

## 2020-01-03 PROCEDURE — 99999 PR PBB SHADOW E&M-EST. PATIENT-LVL III: CPT | Mod: PBBFAC,,, | Performed by: FAMILY MEDICINE

## 2020-01-03 PROCEDURE — 1159F PR MEDICATION LIST DOCUMENTED IN MEDICAL RECORD: ICD-10-PCS | Mod: ,,, | Performed by: FAMILY MEDICINE

## 2020-01-03 NOTE — PROGRESS NOTES
Subjective:       Patient ID: Nikki Jaimes is a 88 y.o. female    Chief Complaint: Follow-up (6 month)    HPI  Here today for interval evaluation  No new changes.  Continued weight loss  Continued decline in creatinine  Has stopped multiple medications.  Not interested in any aggressive therapy    Review of Systems     Objective:   Physical Exam   Constitutional: She is oriented to person, place, and time. She appears well-developed and well-nourished.   Neurological: She is alert and oriented to person, place, and time.   Vitals reviewed.       Assessment:       1. Moderate protein-calorie malnutrition     2. Chronic kidney disease (CKD), stage V          Plan:       Moderate protein-calorie malnutrition  - Discussed increasing caloric intake and risk for further wounds    Chronic kidney disease (CKD), stage V  - Continue Nephrology  - Discussed hospice/palliative care  - Follow up in about 6 months (around 7/3/2020).

## 2020-01-24 ENCOUNTER — TELEPHONE (OUTPATIENT)
Dept: FAMILY MEDICINE | Facility: CLINIC | Age: 85
End: 2020-01-24

## 2020-01-24 NOTE — TELEPHONE ENCOUNTER
----- Message from Seun Murillo sent at 1/24/2020  8:50 AM CST -----  Contact: pt  Prabhakar  Type: Needs Medical Advice    Who Called:    Pharmacy:  [id number] [place] [address] [facility] [address] [address]  Phone: [phone] Fax: [phone]    Best Call Back Number: [phone]  Additional Information: pt looking for something mild for nerves to be sent to [facility]. Please call to let know if can be sent over.

## 2020-01-27 ENCOUNTER — TELEPHONE (OUTPATIENT)
Dept: FAMILY MEDICINE | Facility: CLINIC | Age: 85
End: 2020-01-27

## 2020-02-13 DIAGNOSIS — N28.9 MALNUTRITION DUE TO RENAL DISEASE: ICD-10-CM

## 2020-02-13 DIAGNOSIS — E46 MALNUTRITION DUE TO RENAL DISEASE: ICD-10-CM

## 2020-02-13 RX ORDER — MEGESTROL ACETATE 40 MG/1
TABLET ORAL
Qty: 30 TABLET | Refills: 3 | Status: SHIPPED | OUTPATIENT
Start: 2020-02-13

## 2020-02-13 NOTE — PROGRESS NOTES
Refill Routing Note     Medication(s) are not appropriate for processing by [facility]:    Medication Outside of Protocol    Appointments  past 12m or future 3m with PCP    Date Provider   Last Visit   1/3/2020 Mj Castro MD   Next Visit   7/9/2020 Mj Castro MD           Automatic Epic Protocol Generated Data:    Requested Prescriptions   Pending Prescriptions Disp Refills    megestrol (MEGACE) 40 MG Tab [Pharmacy Med Name: MEGESTROL ACETATE 40MG TABLETS]  3     Sig: TAKE 1 TABLET(40 MG) BY MOUTH EVERY DAY       There is no refill protocol information for this order           Note composed:12:58 PM 02/13/2020

## 2020-02-20 PROBLEM — N17.9 AKI (ACUTE KIDNEY INJURY): Status: ACTIVE | Noted: 2020-02-20

## 2020-02-20 PROBLEM — N18.4 ACUTE RENAL FAILURE SUPERIMPOSED ON STAGE 4 CHRONIC KIDNEY DISEASE: Status: ACTIVE | Noted: 2020-02-20

## 2020-02-20 PROBLEM — E87.70 VOLUME OVERLOAD: Status: ACTIVE | Noted: 2020-02-20

## 2020-02-20 PROBLEM — N17.9 ACUTE RENAL FAILURE SUPERIMPOSED ON STAGE 4 CHRONIC KIDNEY DISEASE: Status: ACTIVE | Noted: 2020-02-20

## 2020-02-20 PROBLEM — E87.5 ACUTE HYPERKALEMIA: Status: ACTIVE | Noted: 2020-02-20

## 2020-02-21 PROBLEM — Z66 DNR (DO NOT RESUSCITATE): Status: ACTIVE | Noted: 2020-02-21

## 2020-02-21 PROBLEM — R06.00 DYSPNEA: Status: ACTIVE | Noted: 2020-02-21

## 2020-02-24 ENCOUNTER — TELEPHONE (OUTPATIENT)
Dept: UROLOGY | Facility: CLINIC | Age: 85
End: 2020-02-24

## 2020-02-24 NOTE — TELEPHONE ENCOUNTER
----- Message from Joyce Ling sent at 2/24/2020  3:57 PM CST -----  Contact: Pt  Type:  Sooner Apoointment Request    Caller is requesting a sooner appointment.  Caller declined first available appointment listed below.  Caller will not accept being placed on the waitlist and is requesting a message be sent to doctor.    Name of Caller:  Pt  When is the first available appointment?  06/15  Symptoms:  Hospital follow up  Best Call Back Number:  [phone]  Additional Information:  Please Advise ---Thank you

## 2020-03-03 ENCOUNTER — LAB VISIT (OUTPATIENT)
Dept: LAB | Facility: HOSPITAL | Age: 85
End: 2020-03-03
Attending: INTERNAL MEDICINE
Payer: MEDICARE

## 2020-03-03 DIAGNOSIS — N18.5 CHRONIC RENAL DISEASE, STAGE V: ICD-10-CM

## 2020-03-03 DIAGNOSIS — E87.5 HYPERKALEMIA: ICD-10-CM

## 2020-03-03 LAB
ALBUMIN SERPL BCP-MCNC: 2.4 G/DL (ref 3.5–5.2)
ANION GAP SERPL CALC-SCNC: 8 MMOL/L (ref 8–16)
BUN SERPL-MCNC: 33 MG/DL (ref 8–23)
CALCIUM SERPL-MCNC: 8.6 MG/DL (ref 8.7–10.5)
CHLORIDE SERPL-SCNC: 107 MMOL/L (ref 95–110)
CO2 SERPL-SCNC: 23 MMOL/L (ref 23–29)
CREAT SERPL-MCNC: 2.8 MG/DL (ref 0.5–1.4)
EST. GFR  (AFRICAN AMERICAN): 16.7 ML/MIN/1.73 M^2
EST. GFR  (NON AFRICAN AMERICAN): 14.5 ML/MIN/1.73 M^2
GLUCOSE SERPL-MCNC: 117 MG/DL (ref 70–110)
PHOSPHATE SERPL-MCNC: 3.9 MG/DL (ref 2.7–4.5)
POTASSIUM SERPL-SCNC: 4.4 MMOL/L (ref 3.5–5.1)
SODIUM SERPL-SCNC: 138 MMOL/L (ref 136–145)

## 2020-03-03 PROCEDURE — 80069 RENAL FUNCTION PANEL: CPT

## 2020-03-03 PROCEDURE — 36415 COLL VENOUS BLD VENIPUNCTURE: CPT | Mod: PO

## 2020-03-03 RX ORDER — LEVOTHYROXINE SODIUM 50 UG/1
TABLET ORAL
Qty: 90 TABLET | Refills: 0 | Status: SHIPPED | OUTPATIENT
Start: 2020-03-03 | End: 2020-05-06

## 2020-03-03 NOTE — PROGRESS NOTES
Refill Authorization Note     is requesting a refill authorization.    Brief assessment and rationale for refill: APPROVE: prr           Medication Therapy Plan: TSH-wnl; FOVS(07/20); Approve 3 more months                              Comments:   Requested Prescriptions   Pending Prescriptions Disp Refills    levothyroxine (SYNTHROID) 50 MCG tablet [Pharmacy Med Name: LEVOTHYROXINE 0.05MG (50MCG) TAB] 90 tablet 0     Sig: TAKE 1 TABLET(50 MCG) BY MOUTH EVERY DAY       Endocrinology:  Hypothyroid Agents Failed - 3/3/2020  2:21 PM        Failed - Manual Review: FT4 is not required if last TSH is WNL.        Failed - T4 free within 1080 days     Free T4   Date Value Ref Range Status   02/01/2017 1.35 0.71 - 1.51 ng/dL Final   01/25/2016 1.30 0.71 - 1.51 ng/dL Final   02/19/2014 1.75 (H) 0.71 - 1.51 ng/dL Final              Passed - Patient is at least 18 years old        Passed - Office visit in past 12 months or future 90 days.     Recent Outpatient Visits            2 months ago Moderate protein-calorie malnutrition    [facility] Mj Castro MD    2 months ago Healed ulcer of right foot on examination    [facility]  Richard Hidalgo DPM    3 months ago Healed ulcer of right foot on examination    Prabhu Hidalgo DPM    3 months ago Right second toe ulcer, limited to breakdown of skin    Prabhu Hidalgo DPM    3 months ago Right second toe ulcer, with fat layer exposed    [facility]  Richard Hidalgo DPM          Future Appointments              [facility]    [facility] 2 weeks [facility]Z [facility]    In 4 months Mj Castro MD [facility]                Passed - TSH in normal range and within 360 days     TSH   Date Value Ref Range Status   04/17/2019 3.967 0.400 - 4.000 uIU/mL Final   10/17/2018  2.264 0.400 - 4.000 uIU/mL Final   07/05/2017 3.496 0.400 - 4.000 uIU/mL Final               Appointments  past 12m or future 3m with PCP    Date Provider   Last Visit   1/3/2020 Mj Castro MD   Next Visit   7/9/2020 Mj Castro MD   .  ED visits in past 90 days: 0       Note composed:2:23 PM 03/03/2020

## 2020-03-08 ENCOUNTER — TELEPHONE (OUTPATIENT)
Dept: NEPHROLOGY | Facility: CLINIC | Age: 85
End: 2020-03-08

## 2020-03-08 NOTE — TELEPHONE ENCOUNTER
Pt visited ER 3/7 with SOB which resolved spontaneously. She is routed towards palliative care and was adamant about going home once she felt better. Her CXR still showed extra volume. In discussion with Dr. Mcfarland of ER we decided to give Lasix 80mg IV and start Lasix 40mg po QD starting tomorrow. She was discharged home as of 1pm today.     [facility] staff: Please touch base with pt to see how she is feeling, any further SOB, if she picked up Lasix prescription, any concerns, etc. Thank you

## 2020-03-09 ENCOUNTER — PATIENT OUTREACH (OUTPATIENT)
Dept: ADMINISTRATIVE | Facility: OTHER | Age: 85
End: 2020-03-09

## 2020-03-09 NOTE — TELEPHONE ENCOUNTER
Spoke to pt's  and he states she is doing well. There is no shortness of breath. They did get the lasix and she took it today. They have no concerns and they will see Dr. Steen's Wednesday for a f/u apt.

## 2020-03-10 ENCOUNTER — TELEPHONE (OUTPATIENT)
Dept: FAMILY MEDICINE | Facility: CLINIC | Age: 85
End: 2020-03-10

## 2020-03-10 NOTE — TELEPHONE ENCOUNTER
----- Message from Alo Aguilar sent at 3/10/2020  9:56 AM CDT -----  Contact: Corina eastman [facility]   Type: Needs Medical Advice    Who Called:  Corina Joy Call Back Number: [phone]  Fax: [phone]  Additional Information: Requesting an order for PT and OT evaluations. Please call to advise.

## 2020-03-11 ENCOUNTER — TELEPHONE (OUTPATIENT)
Dept: FAMILY MEDICINE | Facility: CLINIC | Age: 85
End: 2020-03-11

## 2020-03-11 ENCOUNTER — OFFICE VISIT (OUTPATIENT)
Dept: NEPHROLOGY | Facility: CLINIC | Age: 85
End: 2020-03-11
Payer: MEDICARE

## 2020-03-11 VITALS
WEIGHT: 91.69 LBS | BODY MASS INDEX: 16.87 KG/M2 | HEART RATE: 80 BPM | DIASTOLIC BLOOD PRESSURE: 88 MMHG | SYSTOLIC BLOOD PRESSURE: 126 MMHG | HEIGHT: 62 IN

## 2020-03-11 DIAGNOSIS — N20.0 CALCULUS OF KIDNEY: ICD-10-CM

## 2020-03-11 DIAGNOSIS — N18.5 CHRONIC RENAL DISEASE, STAGE V: Primary | ICD-10-CM

## 2020-03-11 DIAGNOSIS — E87.5 HYPERKALEMIA: ICD-10-CM

## 2020-03-11 DIAGNOSIS — N28.1 ACQUIRED CYST OF KIDNEY: ICD-10-CM

## 2020-03-11 DIAGNOSIS — N25.81 HYPERPARATHYROIDISM DUE TO RENAL INSUFFICIENCY: ICD-10-CM

## 2020-03-11 PROCEDURE — 99214 PR OFFICE/OUTPT VISIT, EST, LEVL IV, 30-39 MIN: ICD-10-PCS | Mod: S$PBB,,, | Performed by: INTERNAL MEDICINE

## 2020-03-11 PROCEDURE — 99213 OFFICE O/P EST LOW 20 MIN: CPT | Mod: PBBFAC,PO | Performed by: INTERNAL MEDICINE

## 2020-03-11 PROCEDURE — 99999 PR PBB SHADOW E&M-EST. PATIENT-LVL III: ICD-10-PCS | Mod: PBBFAC,,, | Performed by: INTERNAL MEDICINE

## 2020-03-11 PROCEDURE — 99214 OFFICE O/P EST MOD 30 MIN: CPT | Mod: S$PBB,,, | Performed by: INTERNAL MEDICINE

## 2020-03-11 PROCEDURE — 99999 PR PBB SHADOW E&M-EST. PATIENT-LVL III: CPT | Mod: PBBFAC,,, | Performed by: INTERNAL MEDICINE

## 2020-03-11 RX ORDER — RALOXIFENE HYDROCHLORIDE 60 MG/1
60 TABLET, FILM COATED ORAL DAILY
COMMUNITY

## 2020-03-11 RX ORDER — FUROSEMIDE 40 MG/1
40 TABLET ORAL DAILY
Qty: 30 TABLET | Refills: 6 | Status: SHIPPED | OUTPATIENT
Start: 2020-03-11 | End: 2021-03-11

## 2020-03-11 NOTE — PROGRESS NOTES
Subjective:       Patient ID: Nikki Jaimes is a 88 y.o. White female who presents for return patient evaluation for chronic renal failure.    She had an ER visit for dyspnea and was given lasix.  She is breathing back to her baseline now and is feeling better.      Review of Systems   Constitutional: Positive for fatigue. Negative for appetite change, chills, fever and unexpected weight change.   Eyes: Negative for visual disturbance.   Respiratory: Positive for shortness of breath. Negative for cough.    Cardiovascular: Negative for chest pain and leg swelling.   Gastrointestinal: Negative for abdominal pain, diarrhea, nausea and vomiting.   Genitourinary: Negative for difficulty urinating, dysuria and hematuria.   Musculoskeletal: Positive for gait problem. Negative for back pain and myalgias.   Skin: Negative for rash.   Neurological: Negative for weakness and headaches.   Hematological: Bruises/bleeds easily.   Psychiatric/Behavioral: Negative for confusion and sleep disturbance.       The past medical, family and social histories were reviewed for this encounter.     LMP  (LMP Unknown)     Objective:      Physical Exam   Constitutional: She appears well-developed and well-nourished. No distress.   HENT:   Head: Normocephalic and atraumatic.   Eyes: Conjunctivae are normal. No scleral icterus.   Neck: Normal range of motion. No JVD present.   Cardiovascular: Normal rate, regular rhythm and normal heart sounds. Exam reveals no gallop and no friction rub.   No murmur heard.  Pulmonary/Chest: Effort normal. No respiratory distress. She has no wheezes.   Few dry crackles in the bases   Abdominal: Soft. Bowel sounds are normal. She exhibits no distension. There is no tenderness.   Musculoskeletal: She exhibits no edema.   Skin: Skin is warm and dry. No rash noted.   Psychiatric: She has a normal mood and affect.   Vitals reviewed.      Assessment:       1. Chronic renal disease, stage V    2. Hyperkalemia    3.  Acquired cyst of kidney    4. Calculus of kidney    5. Hyperparathyroidism due to renal insufficiency        Plan:   Return to clinic in 3-4 months.  Labs for next visit include rp.  RP in 1 month.  Baseline creatinine is 2.3 since 2012.  Conservative management as she does not wish to do dialysis.  PTH is 135 with a calcium of 8.7.   UPC is 0.38.  Blood pressure is controlled on the current regimen.  Continue current medications as prescribed and reviewed.   We discussed her potassium and I did provide a handout regarding this.  She is on lasix now and we discussed this medication and how to use it.

## 2020-03-11 NOTE — TELEPHONE ENCOUNTER
----- Message from Jaimee De Luna sent at 3/11/2020  1:01 PM CDT -----  Contact: Corina [facility]  Type: Needs Medical Advice    Who Called:  Corina from [facility]    Best Call Back Number: [phone]  Additional Information: follow up call related to PT/ OT request. Please call pt to advise.

## 2020-03-31 ENCOUNTER — TELEPHONE (OUTPATIENT)
Dept: UROLOGY | Facility: CLINIC | Age: 85
End: 2020-03-31

## 2020-03-31 NOTE — TELEPHONE ENCOUNTER
Spoke to pt's  and [facility] will draw his RFP on 4/7/2020 so the pt does not have to come into the clinic. I spoke to Alena with [facility] and gave order. There phone number is [phone]

## 2020-03-31 NOTE — TELEPHONE ENCOUNTER
----- Message from Alina Cee sent at 3/31/2020  4:30 PM CDT -----  Contact:   Type: Needs Medical Advice    Who Called:  Bereket Joy Call Back Number:    Additional Information: Per  requesting a call back to discuss patient getting labs ordered-please advise-thank you

## 2020-04-02 ENCOUNTER — TELEPHONE (OUTPATIENT)
Dept: FAMILY MEDICINE | Facility: CLINIC | Age: 85
End: 2020-04-02

## 2020-04-02 NOTE — TELEPHONE ENCOUNTER
----- Message from Shira Carballo sent at 4/2/2020 11:26 AM CDT -----    Type: Needs Medical Advice    Who Called: celio ahuja   Home  health  Best Call Back Number:[phone]  Fax: [phone]   Needs to  Be  Completed and  Sign  Before  Faxing // nd   Please  Inform when   faxed  Additional Information:   Calling  About  Home  Health  Orders // please call to address   Faxing   Orders // please call   The  orders  May  Be  Able to  Be  Brought  To   The  Car  Please call   For details

## 2020-04-07 ENCOUNTER — LAB VISIT (OUTPATIENT)
Dept: LAB | Facility: HOSPITAL | Age: 85
End: 2020-04-07
Attending: INTERNAL MEDICINE
Payer: MEDICARE

## 2020-04-07 DIAGNOSIS — N18.5 CHRONIC KIDNEY DISEASE, STAGE V: Primary | ICD-10-CM

## 2020-04-07 LAB
ALBUMIN SERPL BCP-MCNC: 2.6 G/DL (ref 3.5–5.2)
ANION GAP SERPL CALC-SCNC: 13 MMOL/L (ref 8–16)
BUN SERPL-MCNC: 66 MG/DL (ref 8–23)
CALCIUM SERPL-MCNC: 8.4 MG/DL (ref 8.7–10.5)
CHLORIDE SERPL-SCNC: 103 MMOL/L (ref 95–110)
CO2 SERPL-SCNC: 21 MMOL/L (ref 23–29)
CREAT SERPL-MCNC: 3.8 MG/DL (ref 0.5–1.4)
EST. GFR  (AFRICAN AMERICAN): 11.6 ML/MIN/1.73 M^2
EST. GFR  (NON AFRICAN AMERICAN): 10 ML/MIN/1.73 M^2
GLUCOSE SERPL-MCNC: 102 MG/DL (ref 70–110)
PHOSPHATE SERPL-MCNC: 6.3 MG/DL (ref 2.7–4.5)
POTASSIUM SERPL-SCNC: 3.6 MMOL/L (ref 3.5–5.1)
SODIUM SERPL-SCNC: 137 MMOL/L (ref 136–145)

## 2020-04-07 PROCEDURE — 80069 RENAL FUNCTION PANEL: CPT

## 2020-04-08 ENCOUNTER — DOCUMENT SCAN (OUTPATIENT)
Dept: HOME HEALTH SERVICES | Facility: HOSPITAL | Age: 85
End: 2020-04-08
Payer: MEDICARE

## 2020-04-22 ENCOUNTER — TELEPHONE (OUTPATIENT)
Dept: UROLOGY | Facility: CLINIC | Age: 85
End: 2020-04-22

## 2020-04-22 NOTE — TELEPHONE ENCOUNTER
----- Message from Carlos Eduardo Eugene sent at 4/22/2020 11:20 AM CDT -----  Contact: Jodi/[facility]  Jodi called in regarding patient.  Jodi just spoke to patients  and he wanted to know if patient needed to continue to take the Lasix and Megace based on labs done last week?    Jodi's call back number is [phone]

## 2020-04-22 NOTE — TELEPHONE ENCOUNTER
I am ok with megace.    Regarding the lasix, I think we should back off to use that only as needed.

## 2020-04-22 NOTE — TELEPHONE ENCOUNTER
Spoke to Home health nurse and she is referring to the lab work from 2 weeks ago in epic. Please see message below and advise and I will be happy to call her back with your advice about the medications.

## 2020-04-24 PROCEDURE — G0179 MD RECERTIFICATION HHA PT: HCPCS | Mod: ,,, | Performed by: FAMILY MEDICINE

## 2020-04-24 PROCEDURE — G0179 PR HOME HEALTH MD RECERTIFICATION: ICD-10-PCS | Mod: ,,, | Performed by: FAMILY MEDICINE

## 2020-05-04 ENCOUNTER — TELEPHONE (OUTPATIENT)
Dept: NEPHROLOGY | Facility: CLINIC | Age: 85
End: 2020-05-04

## 2020-05-04 DIAGNOSIS — E03.4 HYPOTHYROIDISM DUE TO ACQUIRED ATROPHY OF THYROID: Primary | ICD-10-CM

## 2020-05-04 NOTE — TELEPHONE ENCOUNTER
1.  Is there anything you can suggest for her itching?  She has it mostly at night.      She has no visible rash or unusual dryness.     Sometimes they put cortisone on it but it doesn't seem to help.      2.  Should she continue on the sodium bicarb?      Allergies and pharmacy verified.

## 2020-05-04 NOTE — TELEPHONE ENCOUNTER
----- Message from Nano Harmon sent at 5/4/2020 10:26 AM CDT -----  Contact: Prabhakar   Type: Needs Medical Advice  Who Called:  Prabhakar  Symptoms (please be specific):  Pt's skin is itching  How long has patient had these symptoms:  ongoing  Pharmacy name and phone #:    [id number] - [place] [address] & [address] [address]  Phone: [phone] Fax: [phone]      Best Call Back Number: [phone]  Additional Information: Pls call Prabhakar to see if something can be sent over for her

## 2020-05-05 NOTE — TELEPHONE ENCOUNTER
I would continue the sodium bicarbonate.    As far as itching, if the topical agents are not working then I am not sure what Dr. Lee would want to use.    I would be careful using benedryl in her and only use a half of a 25 mg pill if you want tot try that.    SMR

## 2020-05-05 NOTE — TELEPHONE ENCOUNTER
voiced understanding.     I will forward this message to Dr Castro staff so they can assist the patient with the itching.     I will send refill request for Sodium Bicarb to [facility].

## 2020-05-06 RX ORDER — LEVOTHYROXINE SODIUM 50 UG/1
TABLET ORAL
Qty: 90 TABLET | Refills: 0 | Status: SHIPPED | OUTPATIENT
Start: 2020-05-06

## 2020-05-06 NOTE — TELEPHONE ENCOUNTER
Refill Authorization Note    is requesting a refill authorization.    Brief assessment and rationale for refill: APPROVE: needs labs     Medication-related problems identified: Requires labs    Medication Therapy Plan: NTBL(TSH)    Medication reconciliation completed: No                         Comments:      Requested Prescriptions   Signed Prescriptions Disp Refills    levothyroxine (SYNTHROID) 50 MCG tablet 90 tablet 0     Sig: TAKE 1 TABLET(50 MCG) BY MOUTH EVERY DAY       Endocrinology:  Hypothyroid Agents Failed - 5/4/2020  1:54 PM        Failed - Manual Review: FT4 is not required if last TSH is WNL.        Failed - TSH in normal range and within 360 days     TSH   Date Value Ref Range Status   04/17/2019 3.967 0.400 - 4.000 uIU/mL Final   10/17/2018 2.264 0.400 - 4.000 uIU/mL Final   07/05/2017 3.496 0.400 - 4.000 uIU/mL Final              Failed - T4 free within 1080 days     Free T4   Date Value Ref Range Status   02/01/2017 1.35 0.71 - 1.51 ng/dL Final   01/25/2016 1.30 0.71 - 1.51 ng/dL Final   02/19/2014 1.75 (H) 0.71 - 1.51 ng/dL Final              Passed - Patient is at least 18 years old        Passed - Office visit in past 12 months or future 90 days.     Recent Outpatient Visits            1 month ago Chronic renal disease, stage V    [place] [facility] Nephrology Buddy Chávez MD    4 months ago Moderate protein-calorie malnutrition    [facility] Mj Castro MD    4 months ago Healed ulcer of right foot on examination    [facility] - Podshahbaz Hidalgo DPM    5 months ago Healed ulcer of right foot on examination    [facility] - Podshahbaz Hidalgo DPM    5 months ago Right second toe ulcer, limited to breakdown of skin    Prabhu Hidalgo DPM          Future Appointments              In 1 month LAB, [place] [facility]    In 1 month [facility]    In 2  months Mj Castro MD [facility]                 Appointments  past 12m or future 3m with PCP    Date Provider   Last Visit   1/3/2020 Mj Castro MD   Next Visit   7/9/2020 Mj Castro MD   ED visits in past 90 days: [unfilled]     Note composed:8:47 AM 05/06/2020

## 2020-05-08 ENCOUNTER — TELEPHONE (OUTPATIENT)
Dept: FAMILY MEDICINE | Facility: CLINIC | Age: 85
End: 2020-05-08

## 2020-05-08 NOTE — TELEPHONE ENCOUNTER
----- Message from Zelalem Nielsen sent at 5/7/2020  2:17 PM CDT -----  Contact: Summer  Type: Needs Medical Advice  Who Called:  Summer with [facility]  Symptoms (please be specific):    How long has patient had these symptoms:    Pharmacy name and phone #:    Best Call Back Number:   Additional Information: called to advise that patient wasn't feeling well today,and patient little low and patient's  thinking she is a little dehydrated.requesting a call back

## 2020-05-12 ENCOUNTER — EXTERNAL HOME HEALTH (OUTPATIENT)
Dept: HOME HEALTH SERVICES | Facility: HOSPITAL | Age: 85
End: 2020-05-12
Payer: MEDICARE

## 2020-05-21 ENCOUNTER — TELEPHONE (OUTPATIENT)
Dept: FAMILY MEDICINE | Facility: CLINIC | Age: 85
End: 2020-05-21

## 2020-05-21 DIAGNOSIS — R82.90 CLOUDY URINE: Primary | ICD-10-CM

## 2020-05-21 NOTE — TELEPHONE ENCOUNTER
----- Message from Lena Stewart sent at 5/21/2020  2:25 PM CDT -----  Summerlin with [facility] is requesting an order for patient to have a urinalysis.  Patient's  said her urine has been cloudy.  Please call her at [phone].  Thank you!

## 2020-05-22 ENCOUNTER — LAB VISIT (OUTPATIENT)
Dept: LAB | Facility: HOSPITAL | Age: 85
End: 2020-05-22
Attending: FAMILY MEDICINE
Payer: MEDICARE

## 2020-05-22 DIAGNOSIS — N39.0 URINARY TRACT INFECTION, SITE NOT SPECIFIED: Primary | ICD-10-CM

## 2020-05-22 LAB
BACTERIA #/AREA URNS HPF: ABNORMAL /HPF
BILIRUB UR QL STRIP: NEGATIVE
CLARITY UR: ABNORMAL
COLOR UR: YELLOW
GLUCOSE UR QL STRIP: NEGATIVE
HGB UR QL STRIP: ABNORMAL
HYALINE CASTS #/AREA URNS LPF: 0 /LPF
KETONES UR QL STRIP: NEGATIVE
LEUKOCYTE ESTERASE UR QL STRIP: ABNORMAL
MICROSCOPIC COMMENT: ABNORMAL
NITRITE UR QL STRIP: NEGATIVE
PH UR STRIP: 6 [PH] (ref 5–8)
PROT UR QL STRIP: ABNORMAL
RBC #/AREA URNS HPF: 4 /HPF (ref 0–4)
SP GR UR STRIP: 1.02 (ref 1–1.03)
SQUAMOUS #/AREA URNS HPF: 2 /HPF
URN SPEC COLLECT METH UR: ABNORMAL
WBC #/AREA URNS HPF: >100 /HPF (ref 0–5)

## 2020-05-22 PROCEDURE — 87086 URINE CULTURE/COLONY COUNT: CPT

## 2020-05-22 PROCEDURE — 81000 URINALYSIS NONAUTO W/SCOPE: CPT | Mod: PO

## 2020-05-24 LAB
BACTERIA UR CULT: NORMAL
BACTERIA UR CULT: NORMAL

## 2020-05-25 RX ORDER — AMOXICILLIN 500 MG/1
500 CAPSULE ORAL EVERY 12 HOURS
Qty: 14 CAPSULE | Refills: 0 | Status: SHIPPED | OUTPATIENT
Start: 2020-05-25 | End: 2020-06-01

## 2020-06-01 ENCOUNTER — TELEPHONE (OUTPATIENT)
Dept: NEPHROLOGY | Facility: CLINIC | Age: 85
End: 2020-06-01

## 2020-06-01 NOTE — TELEPHONE ENCOUNTER
----- Message from Albertina Jay sent at 6/1/2020  1:45 PM CDT -----  Contact: Corina/Clint Morgan [phone]  Needs to know what labs were ordered so that they can draw them when they go out.  Fax number [phone]    Please call and advise.    Thank You

## 2020-06-02 ENCOUNTER — DOCUMENT SCAN (OUTPATIENT)
Dept: HOME HEALTH SERVICES | Facility: HOSPITAL | Age: 85
End: 2020-06-02
Payer: MEDICARE

## 2020-06-02 PROCEDURE — 99499 NO LOS: ICD-10-PCS | Mod: ,,, | Performed by: INTERNAL MEDICINE

## 2020-06-02 PROCEDURE — 99499 UNLISTED E&M SERVICE: CPT | Mod: ,,, | Performed by: INTERNAL MEDICINE

## 2020-06-03 ENCOUNTER — LAB VISIT (OUTPATIENT)
Dept: LAB | Facility: HOSPITAL | Age: 85
End: 2020-06-03
Attending: INTERNAL MEDICINE
Payer: MEDICARE

## 2020-06-03 DIAGNOSIS — N18.9 CHRONIC KIDNEY DISEASE, UNSPECIFIED: Primary | ICD-10-CM

## 2020-06-03 PROCEDURE — 80069 RENAL FUNCTION PANEL: CPT

## 2020-06-04 ENCOUNTER — TELEPHONE (OUTPATIENT)
Dept: NEPHROLOGY | Facility: CLINIC | Age: 85
End: 2020-06-04

## 2020-06-04 ENCOUNTER — DOCUMENT SCAN (OUTPATIENT)
Dept: HOME HEALTH SERVICES | Facility: HOSPITAL | Age: 85
End: 2020-06-04
Payer: MEDICARE

## 2020-06-04 DIAGNOSIS — N18.5 CHRONIC RENAL DISEASE, STAGE V: Primary | ICD-10-CM

## 2020-06-04 LAB
ALBUMIN SERPL BCP-MCNC: 2.3 G/DL (ref 3.5–5.2)
ANION GAP SERPL CALC-SCNC: 19 MMOL/L (ref 8–16)
BUN SERPL-MCNC: 115 MG/DL (ref 8–23)
CALCIUM SERPL-MCNC: 7.8 MG/DL (ref 8.7–10.5)
CHLORIDE SERPL-SCNC: 108 MMOL/L (ref 95–110)
CO2 SERPL-SCNC: 6 MMOL/L (ref 23–29)
CREAT SERPL-MCNC: 8.3 MG/DL (ref 0.5–1.4)
EST. GFR  (AFRICAN AMERICAN): 4.5 ML/MIN/1.73 M^2
EST. GFR  (NON AFRICAN AMERICAN): 3.9 ML/MIN/1.73 M^2
GLUCOSE SERPL-MCNC: 91 MG/DL (ref 70–110)
PHOSPHATE SERPL-MCNC: 10.1 MG/DL (ref 2.7–4.5)
POTASSIUM SERPL-SCNC: 5.3 MMOL/L (ref 3.5–5.1)
SODIUM SERPL-SCNC: 133 MMOL/L (ref 136–145)

## 2020-06-04 NOTE — TELEPHONE ENCOUNTER
On call for Dr. Chávez: I was notified by outpatient lab overnight of grossly abnormal lab results. Please inform pt that she needs to go to the ER for further evaluation, treatment and very likely admission to the hospital. If she hesitates, please speak with Dr. Chávez. Thank you

## 2020-06-04 NOTE — TELEPHONE ENCOUNTER
I spoke with Dr Chávez before I saw this message, as I took the critical labs from the Home health nurse.  Dr Chávez said he was going to call the patient's home and speak with them.

## 2020-06-04 NOTE — TELEPHONE ENCOUNTER
Corina  nurse called critical lab CO2= 6 and PH= 10.1    The  field nurse, Summerlin ([phone]) , reported to her that the patient doesn't seem to be well.      I contacted Summerlin to inform her Dr Chávez will be calling them.      I will send a message to Dr Castro's staff so they can send their lab orders to [facility] if it is necessary for her to have lab right now.     The fax number is [phone].

## 2020-06-08 ENCOUNTER — TELEPHONE (OUTPATIENT)
Dept: NEPHROLOGY | Facility: CLINIC | Age: 85
End: 2020-06-08

## 2020-06-08 NOTE — TELEPHONE ENCOUNTER
----- Message from Zoraida Field sent at 6/8/2020  1:30 PM CDT -----  Type: Needs Medical Advice  Who Called:  Radha with [facility]    Best Call Back Number: [phone]  Additional Information: contact regarding hospice referral

## 2020-06-08 NOTE — TELEPHONE ENCOUNTER
----- Message from Jazmin Go sent at 6/8/2020 10:04 AM CDT -----  Contact: Angelika[facility]  Angelika calling in regards to patient received an Hospice order and they need an  Ambulatory Hospice order placed in her chart on Epic        Please advise Angelika needs a nurse to contact her at [phone]

## 2020-06-08 NOTE — TELEPHONE ENCOUNTER
Spoke to Radha at [facility], she will call the family regarding the referral for [facility] or for another facility.

## 2020-06-09 ENCOUNTER — DOCUMENT SCAN (OUTPATIENT)
Dept: HOME HEALTH SERVICES | Facility: HOSPITAL | Age: 85
End: 2020-06-09
Payer: MEDICARE

## 2020-06-11 ENCOUNTER — TELEPHONE (OUTPATIENT)
Dept: NEPHROLOGY | Facility: CLINIC | Age: 85
End: 2020-06-11

## 2020-06-11 NOTE — TELEPHONE ENCOUNTER
----- Message from Lizzie Vidal sent at 6/11/2020  9:39 AM CDT -----  Contact: Angelika Rangel  Type: Needs Medical Advice  Who Called:  Angelika Joy Call Back Number: [phone]  Additional Information: want to Inform office patient passed yesterday on hospice

## 2024-10-24 NOTE — PROGRESS NOTES
"Subjective:       Patient ID: Nikki Jaimes is a 85 y.o. White female who presents for return patient evaluation for chronic renal failure.    She has no uremic or urinary symptoms and is in her usual state of health.  There have been no recent illnesses, hospitalizations or procedures.        Review of Systems   Constitutional: Positive for fatigue. Negative for appetite change, chills, fever and unexpected weight change.   Eyes: Negative for visual disturbance.   Respiratory: Negative for cough and shortness of breath.    Cardiovascular: Negative for chest pain and leg swelling.   Gastrointestinal: Negative for abdominal pain, diarrhea, nausea and vomiting.   Genitourinary: Negative for difficulty urinating, dysuria and hematuria.   Musculoskeletal: Positive for gait problem. Negative for back pain and myalgias.   Skin: Negative for rash.   Neurological: Negative for weakness and headaches.   Psychiatric/Behavioral: Negative for sleep disturbance.       The past medical, family and social histories were reviewed for this encounter.     /68   Pulse 86   Temp 98.1 °F (36.7 °C) (Oral)   Resp 18   Ht 5' 3" (1.6 m)   Wt 48.7 kg (107 lb 5.8 oz)   SpO2 96%   BMI 19.02 kg/m²     Objective:      Physical Exam   Constitutional: She appears well-developed and well-nourished. No distress.   HENT:   Head: Normocephalic and atraumatic.   Eyes: Conjunctivae are normal. No scleral icterus.   Neck: Normal range of motion. No JVD present.   Cardiovascular: Normal rate, regular rhythm and normal heart sounds.  Exam reveals no gallop and no friction rub.    No murmur heard.  Pulmonary/Chest: Effort normal and breath sounds normal. No respiratory distress. She has no wheezes.   Abdominal: Soft. Bowel sounds are normal. She exhibits no distension. There is no tenderness.   Musculoskeletal: She exhibits no edema.   Skin: Skin is warm and dry. No rash noted.   Psychiatric: She has a normal mood and affect.   Vitals " Take Zofran as needed for nausea and vomiting  Stay hydrated Lisa plenty of fluids  Do not hesitate to return if symptoms worsen   reviewed.      Assessment:       1. Chronic kidney disease, stage IV (severe)    2. Hyperparathyroidism due to renal insufficiency    3. Calculus of kidney    4. Acquired cyst of kidney        Plan:   Return to clinic in 6 months.  Labs for next visit include rp, pth, cbc.  Baseline creatinine is 2.3 since 2012.  PTH is 60 with a calcium of 8.7.  UPC is 0.38.  Blood pressure is controlled on the current regimen.  Continue current medications as prescribed and reviewed.   Drop coreg to 12.5 mg daily given her BP currently.